# Patient Record
Sex: FEMALE | Race: BLACK OR AFRICAN AMERICAN | Employment: PART TIME | URBAN - METROPOLITAN AREA
[De-identification: names, ages, dates, MRNs, and addresses within clinical notes are randomized per-mention and may not be internally consistent; named-entity substitution may affect disease eponyms.]

---

## 2017-01-27 ENCOUNTER — HOSPITAL ENCOUNTER (OUTPATIENT)
Age: 52
Setting detail: OBSERVATION
Discharge: HOME HEALTH | End: 2017-01-28
Attending: EMERGENCY MEDICINE | Admitting: FAMILY MEDICINE
Payer: MEDICARE

## 2017-01-27 ENCOUNTER — APPOINTMENT (OUTPATIENT)
Dept: CT IMAGING | Age: 52
End: 2017-01-27
Attending: FAMILY MEDICINE
Payer: MEDICARE

## 2017-01-27 ENCOUNTER — APPOINTMENT (OUTPATIENT)
Dept: CT IMAGING | Age: 52
End: 2017-01-27
Attending: EMERGENCY MEDICINE
Payer: MEDICARE

## 2017-01-27 ENCOUNTER — APPOINTMENT (OUTPATIENT)
Dept: GENERAL RADIOLOGY | Age: 52
End: 2017-01-27
Attending: EMERGENCY MEDICINE
Payer: MEDICARE

## 2017-01-27 DIAGNOSIS — G40.909 SEIZURE DISORDER (HCC): ICD-10-CM

## 2017-01-27 DIAGNOSIS — R55 VASOVAGAL SYNCOPE: ICD-10-CM

## 2017-01-27 DIAGNOSIS — R10.13 ABDOMINAL PAIN, EPIGASTRIC: ICD-10-CM

## 2017-01-27 DIAGNOSIS — I95.9 HYPOTENSION, UNSPECIFIED HYPOTENSION TYPE: ICD-10-CM

## 2017-01-27 DIAGNOSIS — R51.9 HEADACHE, UNSPECIFIED HEADACHE TYPE: ICD-10-CM

## 2017-01-27 DIAGNOSIS — R55 SYNCOPE, UNSPECIFIED SYNCOPE TYPE: Primary | ICD-10-CM

## 2017-01-27 LAB
ALBUMIN SERPL BCP-MCNC: 3.7 G/DL (ref 3.5–5)
ALBUMIN/GLOB SERPL: 1 {RATIO} (ref 1.1–2.2)
ALP SERPL-CCNC: 105 U/L (ref 45–117)
ALT SERPL-CCNC: 44 U/L (ref 12–78)
ANION GAP BLD CALC-SCNC: 12 MMOL/L (ref 5–15)
AST SERPL W P-5'-P-CCNC: 24 U/L (ref 15–37)
BASOPHILS # BLD AUTO: 0 K/UL (ref 0–0.1)
BASOPHILS # BLD: 1 % (ref 0–1)
BILIRUB SERPL-MCNC: 0.3 MG/DL (ref 0.2–1)
BUN SERPL-MCNC: 26 MG/DL (ref 6–20)
BUN/CREAT SERPL: 23 (ref 12–20)
CALCIUM SERPL-MCNC: 8.9 MG/DL (ref 8.5–10.1)
CHLORIDE SERPL-SCNC: 105 MMOL/L (ref 97–108)
CK SERPL-CCNC: 46 U/L (ref 26–192)
CO2 SERPL-SCNC: 26 MMOL/L (ref 21–32)
CREAT SERPL-MCNC: 1.12 MG/DL (ref 0.55–1.02)
EOSINOPHIL # BLD: 0.1 K/UL (ref 0–0.4)
EOSINOPHIL NFR BLD: 1 % (ref 0–7)
ERYTHROCYTE [DISTWIDTH] IN BLOOD BY AUTOMATED COUNT: 13.2 % (ref 11.5–14.5)
EST. AVERAGE GLUCOSE BLD GHB EST-MCNC: 137 MG/DL
GLOBULIN SER CALC-MCNC: 3.6 G/DL (ref 2–4)
GLUCOSE BLD STRIP.AUTO-MCNC: 100 MG/DL (ref 65–100)
GLUCOSE BLD STRIP.AUTO-MCNC: 101 MG/DL (ref 65–100)
GLUCOSE SERPL-MCNC: 93 MG/DL (ref 65–100)
HBA1C MFR BLD: 6.4 % (ref 4.2–6.3)
HCT VFR BLD AUTO: 41.3 % (ref 35–47)
HGB BLD-MCNC: 13.7 G/DL (ref 11.5–16)
LIPASE SERPL-CCNC: 91 U/L (ref 73–393)
LYMPHOCYTES # BLD AUTO: 37 % (ref 12–49)
LYMPHOCYTES # BLD: 2.2 K/UL (ref 0.8–3.5)
MAGNESIUM SERPL-MCNC: 1.9 MG/DL (ref 1.6–2.4)
MCH RBC QN AUTO: 26.9 PG (ref 26–34)
MCHC RBC AUTO-ENTMCNC: 33.2 G/DL (ref 30–36.5)
MCV RBC AUTO: 81 FL (ref 80–99)
MONOCYTES # BLD: 0.2 K/UL (ref 0–1)
MONOCYTES NFR BLD AUTO: 3 % (ref 5–13)
NEUTS SEG # BLD: 3.4 K/UL (ref 1.8–8)
NEUTS SEG NFR BLD AUTO: 58 % (ref 32–75)
PLATELET # BLD AUTO: 390 K/UL (ref 150–400)
POTASSIUM SERPL-SCNC: 3.3 MMOL/L (ref 3.5–5.1)
PROT SERPL-MCNC: 7.3 G/DL (ref 6.4–8.2)
RBC # BLD AUTO: 5.1 M/UL (ref 3.8–5.2)
SERVICE CMNT-IMP: ABNORMAL
SERVICE CMNT-IMP: NORMAL
SODIUM SERPL-SCNC: 143 MMOL/L (ref 136–145)
TROPONIN I SERPL-MCNC: <0.04 NG/ML
TROPONIN I SERPL-MCNC: <0.04 NG/ML
TSH SERPL DL<=0.05 MIU/L-ACNC: 1.69 UIU/ML (ref 0.36–3.74)
WBC # BLD AUTO: 5.9 K/UL (ref 3.6–11)

## 2017-01-27 PROCEDURE — 83735 ASSAY OF MAGNESIUM: CPT | Performed by: EMERGENCY MEDICINE

## 2017-01-27 PROCEDURE — 99218 HC RM OBSERVATION: CPT

## 2017-01-27 PROCEDURE — 36415 COLL VENOUS BLD VENIPUNCTURE: CPT | Performed by: EMERGENCY MEDICINE

## 2017-01-27 PROCEDURE — 84443 ASSAY THYROID STIM HORMONE: CPT | Performed by: FAMILY MEDICINE

## 2017-01-27 PROCEDURE — 96375 TX/PRO/DX INJ NEW DRUG ADDON: CPT

## 2017-01-27 PROCEDURE — 80053 COMPREHEN METABOLIC PANEL: CPT | Performed by: EMERGENCY MEDICINE

## 2017-01-27 PROCEDURE — 74176 CT ABD & PELVIS W/O CONTRAST: CPT

## 2017-01-27 PROCEDURE — 96366 THER/PROPH/DIAG IV INF ADDON: CPT

## 2017-01-27 PROCEDURE — 82550 ASSAY OF CK (CPK): CPT | Performed by: EMERGENCY MEDICINE

## 2017-01-27 PROCEDURE — 96374 THER/PROPH/DIAG INJ IV PUSH: CPT

## 2017-01-27 PROCEDURE — 83036 HEMOGLOBIN GLYCOSYLATED A1C: CPT | Performed by: FAMILY MEDICINE

## 2017-01-27 PROCEDURE — 96361 HYDRATE IV INFUSION ADD-ON: CPT

## 2017-01-27 PROCEDURE — 82962 GLUCOSE BLOOD TEST: CPT

## 2017-01-27 PROCEDURE — 99285 EMERGENCY DEPT VISIT HI MDM: CPT

## 2017-01-27 PROCEDURE — 70450 CT HEAD/BRAIN W/O DYE: CPT

## 2017-01-27 PROCEDURE — 74022 RADEX COMPL AQT ABD SERIES: CPT

## 2017-01-27 PROCEDURE — 85025 COMPLETE CBC W/AUTO DIFF WBC: CPT | Performed by: EMERGENCY MEDICINE

## 2017-01-27 PROCEDURE — 74011250636 HC RX REV CODE- 250/636: Performed by: FAMILY MEDICINE

## 2017-01-27 PROCEDURE — 96365 THER/PROPH/DIAG IV INF INIT: CPT

## 2017-01-27 PROCEDURE — 74011250637 HC RX REV CODE- 250/637: Performed by: FAMILY MEDICINE

## 2017-01-27 PROCEDURE — 93306 TTE W/DOPPLER COMPLETE: CPT

## 2017-01-27 PROCEDURE — 74011250636 HC RX REV CODE- 250/636: Performed by: EMERGENCY MEDICINE

## 2017-01-27 PROCEDURE — 96376 TX/PRO/DX INJ SAME DRUG ADON: CPT

## 2017-01-27 PROCEDURE — 83690 ASSAY OF LIPASE: CPT | Performed by: EMERGENCY MEDICINE

## 2017-01-27 PROCEDURE — 84484 ASSAY OF TROPONIN QUANT: CPT | Performed by: FAMILY MEDICINE

## 2017-01-27 PROCEDURE — 96372 THER/PROPH/DIAG INJ SC/IM: CPT

## 2017-01-27 RX ORDER — ESCITALOPRAM OXALATE 10 MG/1
20 TABLET ORAL DAILY
Status: DISCONTINUED | OUTPATIENT
Start: 2017-01-28 | End: 2017-01-28 | Stop reason: HOSPADM

## 2017-01-27 RX ORDER — LEVETIRACETAM 500 MG/1
500 TABLET ORAL 2 TIMES DAILY
Status: DISCONTINUED | OUTPATIENT
Start: 2017-01-27 | End: 2017-01-27

## 2017-01-27 RX ORDER — DEXTROSE 50 % IN WATER (D50W) INTRAVENOUS SYRINGE
12.5-25 AS NEEDED
Status: DISCONTINUED | OUTPATIENT
Start: 2017-01-27 | End: 2017-01-28 | Stop reason: HOSPADM

## 2017-01-27 RX ORDER — ATORVASTATIN CALCIUM 10 MG/1
10 TABLET, FILM COATED ORAL
COMMUNITY

## 2017-01-27 RX ORDER — OXYCODONE AND ACETAMINOPHEN 10; 325 MG/1; MG/1
1 TABLET ORAL
Status: DISCONTINUED | OUTPATIENT
Start: 2017-01-27 | End: 2017-01-28 | Stop reason: HOSPADM

## 2017-01-27 RX ORDER — POTASSIUM CHLORIDE 750 MG/1
20 TABLET, FILM COATED, EXTENDED RELEASE ORAL
Status: DISCONTINUED | OUTPATIENT
Start: 2017-01-27 | End: 2017-01-27

## 2017-01-27 RX ORDER — MORPHINE SULFATE 2 MG/ML
1 INJECTION, SOLUTION INTRAMUSCULAR; INTRAVENOUS
Status: DISCONTINUED | OUTPATIENT
Start: 2017-01-27 | End: 2017-01-28 | Stop reason: HOSPADM

## 2017-01-27 RX ORDER — SODIUM CHLORIDE 9 MG/ML
75 INJECTION, SOLUTION INTRAVENOUS CONTINUOUS
Status: DISCONTINUED | OUTPATIENT
Start: 2017-01-27 | End: 2017-01-28 | Stop reason: HOSPADM

## 2017-01-27 RX ORDER — ACETAMINOPHEN 325 MG/1
650 TABLET ORAL
Status: DISCONTINUED | OUTPATIENT
Start: 2017-01-27 | End: 2017-01-28 | Stop reason: HOSPADM

## 2017-01-27 RX ORDER — POTASSIUM CHLORIDE 14.9 MG/ML
10 INJECTION INTRAVENOUS
Status: COMPLETED | OUTPATIENT
Start: 2017-01-27 | End: 2017-01-28

## 2017-01-27 RX ORDER — INSULIN LISPRO 100 [IU]/ML
INJECTION, SOLUTION INTRAVENOUS; SUBCUTANEOUS EVERY 6 HOURS
Status: DISCONTINUED | OUTPATIENT
Start: 2017-01-27 | End: 2017-01-28 | Stop reason: HOSPADM

## 2017-01-27 RX ORDER — MAGNESIUM SULFATE 100 %
4 CRYSTALS MISCELLANEOUS AS NEEDED
Status: DISCONTINUED | OUTPATIENT
Start: 2017-01-27 | End: 2017-01-28 | Stop reason: HOSPADM

## 2017-01-27 RX ORDER — MORPHINE SULFATE 4 MG/ML
4 INJECTION, SOLUTION INTRAMUSCULAR; INTRAVENOUS ONCE
Status: COMPLETED | OUTPATIENT
Start: 2017-01-27 | End: 2017-01-27

## 2017-01-27 RX ORDER — PANTOPRAZOLE SODIUM 40 MG/1
40 TABLET, DELAYED RELEASE ORAL DAILY
Status: DISCONTINUED | OUTPATIENT
Start: 2017-01-28 | End: 2017-01-28 | Stop reason: HOSPADM

## 2017-01-27 RX ORDER — SODIUM CHLORIDE 0.9 % (FLUSH) 0.9 %
5-10 SYRINGE (ML) INJECTION EVERY 8 HOURS
Status: DISCONTINUED | OUTPATIENT
Start: 2017-01-27 | End: 2017-01-28 | Stop reason: HOSPADM

## 2017-01-27 RX ORDER — ATORVASTATIN CALCIUM 10 MG/1
10 TABLET, FILM COATED ORAL
Status: DISCONTINUED | OUTPATIENT
Start: 2017-01-27 | End: 2017-01-28 | Stop reason: HOSPADM

## 2017-01-27 RX ORDER — HEPARIN SODIUM 5000 [USP'U]/ML
5000 INJECTION, SOLUTION INTRAVENOUS; SUBCUTANEOUS EVERY 8 HOURS
Status: DISCONTINUED | OUTPATIENT
Start: 2017-01-27 | End: 2017-01-28 | Stop reason: HOSPADM

## 2017-01-27 RX ORDER — TOPIRAMATE 100 MG/1
100 TABLET, FILM COATED ORAL DAILY
Status: DISCONTINUED | OUTPATIENT
Start: 2017-01-28 | End: 2017-01-28 | Stop reason: HOSPADM

## 2017-01-27 RX ORDER — SODIUM CHLORIDE 0.9 % (FLUSH) 0.9 %
5-10 SYRINGE (ML) INJECTION AS NEEDED
Status: DISCONTINUED | OUTPATIENT
Start: 2017-01-27 | End: 2017-01-28 | Stop reason: HOSPADM

## 2017-01-27 RX ORDER — POLYETHYLENE GLYCOL 3350 17 G/17G
17 POWDER, FOR SOLUTION ORAL 2 TIMES DAILY
COMMUNITY
End: 2017-01-28

## 2017-01-27 RX ADMIN — SODIUM CHLORIDE 1000 ML: 900 INJECTION, SOLUTION INTRAVENOUS at 13:56

## 2017-01-27 RX ADMIN — POTASSIUM CHLORIDE 10 MEQ: 200 INJECTION, SOLUTION INTRAVENOUS at 23:24

## 2017-01-27 RX ADMIN — Medication 10 ML: at 21:33

## 2017-01-27 RX ADMIN — HEPARIN SODIUM 5000 UNITS: 5000 INJECTION, SOLUTION INTRAVENOUS; SUBCUTANEOUS at 21:32

## 2017-01-27 RX ADMIN — POTASSIUM CHLORIDE 10 MEQ: 200 INJECTION, SOLUTION INTRAVENOUS at 21:32

## 2017-01-27 RX ADMIN — SODIUM CHLORIDE 75 ML/HR: 900 INJECTION, SOLUTION INTRAVENOUS at 21:31

## 2017-01-27 RX ADMIN — ATORVASTATIN CALCIUM 10 MG: 10 TABLET, FILM COATED ORAL at 21:32

## 2017-01-27 RX ADMIN — Medication 4 MG: at 13:59

## 2017-01-27 RX ADMIN — PROMETHAZINE HYDROCHLORIDE 12.5 MG: 25 INJECTION INTRAMUSCULAR; INTRAVENOUS at 13:59

## 2017-01-27 RX ADMIN — LEVETIRACETAM 500 MG: 100 SOLUTION ORAL at 21:33

## 2017-01-27 RX ADMIN — OXYCODONE HYDROCHLORIDE AND ACETAMINOPHEN 1 TABLET: 10; 325 TABLET ORAL at 21:32

## 2017-01-27 RX ADMIN — Medication 4 MG: at 16:06

## 2017-01-27 NOTE — IP AVS SNAPSHOT
[address] [facility] [address] [facility] 
[phone] Patient: Erich Armas MRN: [id number] :1965 You are allergic to the following No active allergies Recent Documentation OB Status Smoking Status Hysterectomy Never Smoker Emergency Contacts Name Discharge Info Relation Home Work Mobile Fadi Neal  Spouse [3] [phone] Charan Akhtar CAREGIVER [3] Friend [5] [phone] Ella  Other Relative [6] [phone] Marcie Goel DISCHARGE CAREGIVER [3] Daughter [21] [phone] About your hospitalization You were admitted on:  2017 You last received care in the:  [facility] 5 OBSERVATION You were discharged on:  2017 Unit phone number:  [phone] Why you were hospitalized Your primary diagnosis was:  Syncope Your diagnoses also included:  Seizure Disorder (Hcc), Thor (Obstructive Sleep Apnea), Jejunostomy Tube Present (Hcc), Hyperlipidemia, Gerd (Gastroesophageal Reflux Disease), Gastroparesis, Essential Hypertension, Diabetes Mellitus Type 2, Controlled (Hcc), Hypotension Providers Seen During Your Hospitalizations Provider Role Specialty Primary office phone Jeff Li MD Attending Provider Emergency Medicine [phone] Debbie Ortiz MD Attending Provider Hospitalist [phone] [address] [place] [address] 951, DO Attending Provider Internal Medicine [phone] Your Primary Care Physician (PCP) Primary Care Physician Office Phone Office Fax [place]thington Daryl [phone] Follow-up Information Follow up With Details Comments Contact Info Lucinda Wilder MD   [address] [place] [address] 
[phone] Current Discharge Medication List  
  
START taking these medications Dose & Instructions Dispensing Information Comments Morning Noon Evening Bedtime  
 midodrine 5 mg tablet Commonly known as:  Chelsy Lyndsey Your next dose is: Today, Tomorrow Other:  _________ Dose:  5 mg Take 1 Tab by mouth three (3) times daily (with meals) for 30 days. Quantity:  90 Tab Refills:  0 CONTINUE these medications which have NOT CHANGED Dose & Instructions Dispensing Information Comments Morning Noon Evening Bedtime  
 atorvastatin 10 mg tablet Commonly known as:  LIPITOR Your next dose is: Today, Tomorrow Other:  _________ Dose:  10 mg Take 10 mg by mouth nightly. Refills:  0 LEXAPRO 20 mg tablet Generic drug:  escitalopram oxalate Your next dose is: Today, Tomorrow Other:  _________ Dose:  20 mg Take 20 mg by mouth daily. Refills:  0  
     
   
   
   
  
 metFORMIN 500 mg tablet Commonly known as:  GLUCOPHAGE Your next dose is: Today, Tomorrow Other:  _________ Dose:  500 mg Take 500 mg by mouth two (2) times daily (with meals). Refills:  0  
     
   
   
   
  
 omeprazole 20 mg capsule Commonly known as:  PRILOSEC Your next dose is: Today, Tomorrow Other:  _________ 1 po bid prn Quantity:  60 Cap Refills:  5  
     
   
   
   
  
 oxyCODONE-acetaminophen  mg per tablet Commonly known as:  PERCOCET 10 Your next dose is: Today, Tomorrow Other:  _________ Dose:  1 Tab Take 1 Tab by mouth every six (6) hours as needed for Pain. Refills:  0  
     
   
   
   
  
 TOPAMAX 100 mg tablet Generic drug:  topiramate Your next dose is: Today, Tomorrow Other:  _________ Dose:  100 mg Take 100 mg by mouth daily. Refills:  0 STOP taking these medications MIRALAX 17 gram packet Generic drug:  polyethylene glycol Where to Get Your Medications Information on where to get these meds will be given to you by the nurse or doctor. ! Ask your nurse or doctor about these medications  
  midodrine 5 mg tablet Discharge Instructions None Discharge Orders None S[employer]ethe[employer] Announcement We are excited to announce that we are making your provider's discharge notes available to you in Slicethepie. You will see these notes when they are completed and signed by the physician that discharged you from your recent hospital stay. If you have any questions or concerns about any information you see in Slicethepie, please call the Health Information Department where you were seen or reach out to your Primary Care Provider for more information about your plan of care. Introducing [facility]! Dear Fadi Sees: Thank you for requesting a Slicethepie account. Our records indicate that you already have an active Slicethepie account. You can access your account anytime at [email or web address] Did you know that you can access your hospital and ER discharge instructions at any time in Slicethepie? You can also review all of your test results from your hospital stay or ER visit. Additional Information If you have questions, please visit the Frequently Asked Questions section of the S[employer]ethe[employer] website at [email or web address] Remember, S[employer]ethepie is NOT to be used for urgent needs. For medical emergencies, dial 911. Now available from your iPhone and Android! General Information Please provide this summary of care documentation to your next provider. Patient Signature:  ____________________________________________________________ Date:  ____________________________________________________________  
  
[facility]  Provider Signature: ____________________________________________________________ Date:  ____________________________________________________________

## 2017-01-27 NOTE — H&P
[address] [facility] [address]   HISTORY AND PHYSICAL       Name:  Agnieszka Patel   MR#:  [id number]   :  1965   Account #:  [de-identified]        Date of Adm:  2017       ATTENDING PHYSICIAN: Karrie Brown MD.    CHIEF COMPLAINT: Loss of consciousness. HISTORY OF PRESENT ILLNESS: The patient is a 61-year-old   female with a past medical history of hypertension, diabetes, seizures,   gastroparesis, status post gastric bypass with a J-tube, history of   hypercholesterolemia, diverticulitis, sleep apnea, anemia, and renal   artery aneurysm, who presents to the hospital complaining of the   above-mentioned symptoms. History was obtained from the patient. The patient apparently reports that she was at a [employer] today   and was having lunch when she suddenly had significant amount of   abdominal pain, had a headache and apparently passed out. The   patient reports that she had a friend who is not present at this point of   time, but told her that she was having \"seizure-like activity. \" Per ER   note the witness did not notice any shaking. Per EMS, the patient was   weak, but had no postictal confusion and her blood pressure on the   scene was 56 by. The patient reports that she has had some   nausea and vomiting associated with diarrhea in the past few days. She has had family members who have been sick including her   grandson, and her daughter who had been seen in the hospital for a   \"GI bug.\" The patient denies any other complaints or problems. Denies   any recent seizure. She reports she takes Topamax for seizures.    Denies any headache, blurry vision, sore throat, trouble swallowing,   trouble with speech, any chest pain, shortness of breath,   lightheadedness, dizziness, focal or generalized neurological   weakness, hematemesis, melena, hemoptysis, urinary symptoms,   focal or generalized neurological weakness, recent travels or sick contacts. PAST MEDICAL HISTORY: See above. HOME MEDICATIONS    1. Lipitor 10 mg daily. 2. MiraLax 17 grams b.i.d.   3. Percocet 1 tablet every 6 hours as needed for pain. 4. Metformin 500 mg b.i.d.   5. Omeprazole 20 mg daily. 6. Lexapro 20 mg daily. 7. Topamax 100 mg daily. SOCIAL HISTORY: Denies alcohol, tobacco, IV drug abuse. Lives at   home. REVIEW OF SYSTEMS   A 10-point review of systems was done. It is essentially negative   except for symptoms mentioned above. ALLERGIES: NO KNOWN DRUG ALLERGIES. FAMILY HISTORY: Mother had history of diabetes, hypertension,   hyperlipidemia, coronary artery disease and CVA. Father had a history   of hypertension. PHYSICAL EXAMINATION   VITAL SIGNS: Temperature 97.7, pulse 80, respiratory rate 14, blood   pressure 115/76, pulse oximetry 97% on room air. GENERAL: Alert and oriented x3, awake, in no acute distress, resting   in bed, pleasant female, appears to be stated age. HEENT: Pupils equal and reactive to light. Dry mucous membranes. Tympanic membranes clear. NECK: Supple. CHEST: Clear to auscultation bilaterally. CORONARY: S1, S2 were heard. ABDOMEN: Soft, tender to palpation diffusely. No rebound. Mild   guarding. EXTREMITIES: No clubbing, no cyanosis, no edema. NEUROPSYCHIATRIC: Pleasant mood and affect. Sensory grossly   within normal limits. DTR 2+ into 4. Cranial nerves 2-12 grossly intact. Strength 5/5. SKIN: Warm. LABORATORY DATA: White count 5.9, hemoglobin 13.7, hematocrit   41.3, platelets 279. Sodium 142, potassium 3.3, chloride 105, BUN 26,   creatinine 1.12, calcium 8.9, magnesium 1.9, bilirubin total 0.3, protein   7.3, albumin 3.7, ALT 44, AST 24, alkaline phosphatase 105, lipase   91. CK 46.  CT of the abdomen and pelvis shows status post   hysterectomy and appendectomy, moderate status is stable, a 2.2   right renal artery aneurysm, dating back to 04/13/2008, no acute   abnormality identified. ASSESSMENT AND PLAN   1. Syncope, unclear etiology, appears to be secondary to hypertension   and I am not sure why the patient is hypertensive. There was concern   for seizure. Regardless, the patient will be observed on a telemetry   bed. The patient will be on seizure precautions. Will start the patient   on IV hydration, neurovascular checks, neurology consult. CT of the   head has been ordered and we will review the same. Will provide   orthostatic vitals, echocardiogram and further intervention will be per   hospital course. Will reassess as needed. Continue to closely monitor. Further intervention will be per hospital course. 2. Abdominal pain. unclear etiology. The patient has had some   diarrhea. Will check stool for culture. at this point in time. Will   provide pain medication, IV fluids and continue to closely monitor. Further intervention will be per hospital course. 3. Diabetes type 2: HARI ceron. 4. Gastrointestinal and deep venous thrombosis prophylaxis. The   patient is on SCDs.         Collier Aschoff, MD      MM / DV   D:  01/27/2017   17:58   T:  01/27/2017   18:54   Job #:  [id number]

## 2017-01-27 NOTE — ED TRIAGE NOTES
Pt arrives via EMS from [facility]. Friend witnessed pt staring off while at restaurant, no shaking noted and no incontinence but pt vomited. EMS stating that pt was initially hypotensive at 56/30 but no post ictal phase noted. Pt denies knowing when last seizure was but is prescribed Topamax.  Pt presents A&0 x 4 but c/o headache     Hx: gastroparesis, gastrectomy,     BS: 167
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
## 2017-01-27 NOTE — IP AVS SNAPSHOT
Current Discharge Medication List  
  
Take these medications at their scheduled times Dose & Instructions Dispensing Information Comments Morning Noon Evening Bedtime  
 atorvastatin 10 mg tablet Commonly known as:  LIPITOR Your next dose is: Today, Tomorrow Other:  ____________ Dose:  10 mg Take 10 mg by mouth nightly. Refills:  0 LEXAPRO 20 mg tablet Generic drug:  escitalopram oxalate Your next dose is: Today, Tomorrow Other:  ____________ Dose:  20 mg Take 20 mg by mouth daily. Refills:  0  
     
   
   
   
  
 metFORMIN 500 mg tablet Commonly known as:  GLUCOPHAGE Your next dose is: Today, Tomorrow Other:  ____________ Dose:  500 mg Take 500 mg by mouth two (2) times daily (with meals). Refills:  0  
     
   
   
   
  
 midodrine 5 mg tablet Commonly known as:  Zenaida Uriah Your next dose is: Today, Tomorrow Other:  ____________ Dose:  5 mg Take 1 Tab by mouth three (3) times daily (with meals) for 30 days. Quantity:  90 Tab Refills:  0  
     
   
   
   
  
 TOPAMAX 100 mg tablet Generic drug:  topiramate Your next dose is: Today, Tomorrow Other:  ____________ Dose:  100 mg Take 100 mg by mouth daily. Refills:  0 Take these medications as needed Dose & Instructions Dispensing Information Comments Morning Noon Evening Bedtime  
 oxyCODONE-acetaminophen  mg per tablet Commonly known as:  PERCOCET 10 Your next dose is: Today, Tomorrow Other:  ____________ Dose:  1 Tab Take 1 Tab by mouth every six (6) hours as needed for Pain. Refills:  0 Take these medications as directed Dose & Instructions Dispensing Information Comments Morning Noon Evening Bedtime  
 omeprazole 20 mg capsule Commonly known as:  PRILOSEC Your next dose is: Today, Tomorrow Other:  ____________ 1 po bid prn Quantity:  60 Cap Refills:  5 Where to Get Your Medications Information about where to get these medications is not yet available ! Ask your nurse or doctor about these medications  
  midodrine 5 mg tablet
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
## 2017-01-28 VITALS
HEART RATE: 76 BPM | SYSTOLIC BLOOD PRESSURE: 108 MMHG | OXYGEN SATURATION: 98 % | DIASTOLIC BLOOD PRESSURE: 74 MMHG | RESPIRATION RATE: 16 BRPM | TEMPERATURE: 98.7 F

## 2017-01-28 LAB
GLUCOSE BLD STRIP.AUTO-MCNC: 109 MG/DL (ref 65–100)
GLUCOSE BLD STRIP.AUTO-MCNC: 174 MG/DL (ref 65–100)
SERVICE CMNT-IMP: ABNORMAL
SERVICE CMNT-IMP: ABNORMAL

## 2017-01-28 PROCEDURE — 74011636637 HC RX REV CODE- 636/637: Performed by: FAMILY MEDICINE

## 2017-01-28 PROCEDURE — 74011250636 HC RX REV CODE- 250/636: Performed by: FAMILY MEDICINE

## 2017-01-28 PROCEDURE — 74011250637 HC RX REV CODE- 250/637: Performed by: FAMILY MEDICINE

## 2017-01-28 PROCEDURE — 99218 HC RM OBSERVATION: CPT

## 2017-01-28 PROCEDURE — 96375 TX/PRO/DX INJ NEW DRUG ADDON: CPT

## 2017-01-28 PROCEDURE — 82962 GLUCOSE BLOOD TEST: CPT

## 2017-01-28 PROCEDURE — 96376 TX/PRO/DX INJ SAME DRUG ADON: CPT

## 2017-01-28 PROCEDURE — 77030012890

## 2017-01-28 PROCEDURE — 96361 HYDRATE IV INFUSION ADD-ON: CPT

## 2017-01-28 PROCEDURE — 96372 THER/PROPH/DIAG INJ SC/IM: CPT

## 2017-01-28 RX ORDER — MIDODRINE HYDROCHLORIDE 5 MG/1
5 TABLET ORAL
Qty: 90 TAB | Refills: 0 | Status: SHIPPED | OUTPATIENT
Start: 2017-01-28 | End: 2017-02-27

## 2017-01-28 RX ORDER — MIDODRINE HYDROCHLORIDE 2.5 MG/1
5 TABLET ORAL
Status: DISCONTINUED | OUTPATIENT
Start: 2017-01-28 | End: 2017-01-28 | Stop reason: HOSPADM

## 2017-01-28 RX ORDER — ONDANSETRON 2 MG/ML
4 INJECTION INTRAMUSCULAR; INTRAVENOUS
Status: DISCONTINUED | OUTPATIENT
Start: 2017-01-28 | End: 2017-01-28 | Stop reason: HOSPADM

## 2017-01-28 RX ADMIN — ESCITALOPRAM OXALATE 20 MG: 10 TABLET, FILM COATED ORAL at 11:05

## 2017-01-28 RX ADMIN — ONDANSETRON 4 MG: 2 INJECTION INTRAMUSCULAR; INTRAVENOUS at 11:04

## 2017-01-28 RX ADMIN — SODIUM CHLORIDE 75 ML/HR: 900 INJECTION, SOLUTION INTRAVENOUS at 11:23

## 2017-01-28 RX ADMIN — TOPIRAMATE 100 MG: 100 TABLET, FILM COATED ORAL at 11:05

## 2017-01-28 RX ADMIN — Medication 10 ML: at 14:13

## 2017-01-28 RX ADMIN — PANTOPRAZOLE SODIUM 40 MG: 40 TABLET, DELAYED RELEASE ORAL at 11:05

## 2017-01-28 RX ADMIN — Medication 10 ML: at 07:03

## 2017-01-28 RX ADMIN — Medication 1 MG: at 03:37

## 2017-01-28 RX ADMIN — HEPARIN SODIUM 5000 UNITS: 5000 INJECTION, SOLUTION INTRAVENOUS; SUBCUTANEOUS at 05:20

## 2017-01-28 RX ADMIN — LEVETIRACETAM 500 MG: 100 SOLUTION ORAL at 11:12

## 2017-01-28 RX ADMIN — Medication 1 MG: at 11:12

## 2017-01-28 RX ADMIN — ONDANSETRON 4 MG: 2 INJECTION INTRAMUSCULAR; INTRAVENOUS at 03:53

## 2017-01-28 RX ADMIN — INSULIN LISPRO 2 UNITS: 100 INJECTION, SOLUTION INTRAVENOUS; SUBCUTANEOUS at 12:43

## 2017-01-28 RX ADMIN — HEPARIN SODIUM 5000 UNITS: 5000 INJECTION, SOLUTION INTRAVENOUS; SUBCUTANEOUS at 14:13

## 2017-01-28 NOTE — PROGRESS NOTES
Went in to check on pt. Pt states she feels dizzy, and like \"the boat it rocking. \"  She states it is getting better but still bracing herself on the bed. Took BP while sitting and got her to stand, pt not orthostatic there. She states she did not start feeling dizzy until after she stood up in the BR. Pt requested to walk a little to see if it happened again. Pt made it approx 25 ft prior stating she felt dizzy and needed to sit down. She recovered a little quicker and BP did not drop. Will continue to monitor pt.

## 2017-01-28 NOTE — CONSULTS
[facility] [address]   [address] [facility] ([place], [place])       Name:  Sailaja Bolanos   MR#:  [id number]   :  1965   Account #:  [de-identified]    Date of Consultation:     Date of Adm:  2017       REQUESTING PHYSICIAN: Dr. Ke Hernandez. REASON FOR EVALUATION: Syncope. HISTORY OF PRESENT ILLNESS: This patient is a 25-year-old   female with past medical history of hypertension, diabetes, seizure-like   activity, who follows up with a neurologist, Dr. Colt Perrin. She has also had   gastric bypass. with J-tube, hypercholesterolemia, sleep apnea and   renal artery aneurysm. She has migraine headaches and is on   topiramate for prophylaxis. She went to eat with her friend yesterday   and was having some abdominal pain. She had eaten some when she   started to feel sweaty and lightheaded. The next thing she knows is   waking up with paramedics around her. Apparently, her friend reported   that her eyes rolled back and she slumped over the table. Some   twitching movement was seen in her extremities. She has had similar   episodes before and was diagnosed with possible partial seizures by   Dr. Colt Perrin. She was brought to the hospital and was admitted for   observation. She was found to be hypotensive on arrival. This morning   she feels fine and is back to her baseline. She does get migraines a   few times a month. Denies any changes in vision, speech, focal motor   sensory deficits, chest pain, shortness of breath, palpitations, or   difficulty with balance. PAST MEDICAL HISTORY: As mentioned above. HOME MEDICATIONS   1. Lipitor. 2. Percocet as needed. 3. Metformin. 4. Omeprazole. 5. Topamax 100 mg daily. 6. Lexapro. SOCIAL HISTORY: No history of smoking or alcohol use. REVIEW OF SYSTEMS: As mentioned above. ALLERGIES: NONE. FAMILY HISTORY: No history of epilepsy in the family.  Mother with   diabetes, hypertension, hyperlipidemia, coronary artery disease and   stroke. Father with hypertension. PHYSICAL EXAMINATION   GENERAL: The patient is alert, fully oriented. VITAL SIGNS: Blood pressure 111/70, temperature 98.6, pulse 84. HEENT: Speech is clear. Comprehension is normal. Pupils are equal,   round and reactive. Extraocular movements are full. Face is   symmetric. Tongue is midline. His muscle tone and bulk is normal.   Strength is normal in all extremities. Deep tendon reflexes are 2/2 and   symmetric. HEART: Regular rate and rhythm. CHEST: Clear. ABDOMEN: Soft, nontender, positive bowel sounds. EXTREMITIES: No edema. LABORATORY DATA: CBC: Chemistries were unremarkable. Hemoglobin A1c 6.4. CT scan of the brain did not show any acute abnormalities. MRI scan of the brain last year did not show any abnormalities except   for mild nonspecific white matter changes. EEG in the past has been normal.    ASSESSMENT AND PLAN: A 49-year-old female admitted after an   episode where she had abdominal pain, headache followed by profuse   sweating, lightheadedness and then syncope with questionable   shaking movements. I suspect that this was most likely a vasovagal or   convulsive syncope. No additional neurological workup needed at this   time as it would not alter management. We should continue topiramate   as is. She should followup with her primary neurologist periodically. Please feel free to contact me with any further questions. Thank you   for this consultation.          MD TOSHIA Ross / Cherelle   D:  01/28/2017   12:18   T:  01/28/2017   12:46   Job #:  [id number]

## 2017-01-28 NOTE — PROGRESS NOTES
TRANSFER - IN REPORT:    Verbal report received from [facility] (name) on Render Newer  being received from ED (unit) for routine progression of care      Report consisted of patients Situation, Background, Assessment and   Recommendations(SBAR). Information from the following report(s) SBAR, Kardex, ED Summary, [facility] and Recent Results was reviewed with the receiving nurse. Opportunity for questions and clarification was provided. Assessment completed upon patients arrival to unit and care assumed.

## 2017-01-28 NOTE — ROUTINE PROCESS
Bedside and Verbal shift change report given to [address] [place] [address] (oncoming nurse) by GLENNY Singh RN (offgoing nurse). Report given with SBAR, Kardex, Intake/Output, MAR and Recent Results.

## 2017-01-28 NOTE — PROGRESS NOTES
CM received consult for home health services. NANCY spoke with Dr Radha Lin, [facility] physician, who requested home health referral be sent to [facility]. Dr Radha Lin understood confirmation of receipt of referral would be after discharge. CM sent referral via [employer] to [facility], and response is pending.

## 2017-01-28 NOTE — PROGRESS NOTES
Per Dr. Rebeka Medel pt is a [facility] pt and they should be seeing her. Spoke with Dr. Silvia Arroyo and he will inform the [facility] hospitalist.  Informed Dr. Rebeka Medel that this nurse had spoken with Dr. Silvia Arroyo.

## 2017-01-28 NOTE — ED NOTES
TRANSFER - OUT REPORT:    Verbal report given to Paulino Gallego RN(name) on Janet Eddy  being transferred to [facility](unit) for routine progression of care       Report consisted of patients Situation, Background, Assessment and   Recommendations(SBAR). Information from the following report(s) SBAR and MAR was reviewed with the receiving nurse. Lines:   Peripheral IV 01/27/17 Left Antecubital (Active)   Site Assessment Clean, dry, & intact 1/27/2017  1:51 PM   Phlebitis Assessment 0 1/27/2017  1:51 PM   Infiltration Assessment 0 1/27/2017  1:51 PM   Dressing Status Clean, dry, & intact 1/27/2017  1:51 PM        Opportunity for questions and clarification was provided.       Patient transported with:   [employer]

## 2017-01-28 NOTE — DISCHARGE SUMMARY
[facility]   [address] [facility] D[facility]  [phone]    Discharge Summary     PATIENT ID: Ileana Duron  MRN: [id number]   YOB: 1965    DATE OF ADMISSION: 1/27/2017 12:46 PM    DATE OF DISCHARGE: 1/28/17  PRIMARY CARE PROVIDER: Sid Walter MD       DISCHARGING PHYSICIAN: Kendra Staples DO    To contact this individual call [phone] and ask the  to page. If unavailable ask to be transferred the Adult Hospitalist Department. CONSULTATIONS: IP CONSULT TO HOSPITALIST  IP CONSULT TO NEUROLOGY    PROCEDURES/SURGERIES: * No surgery found *    ADMITTING [facility] COURSE:   The patient is a 66-year-old   female with a past medical history of hypertension, diabetes, seizures,   gastroparesis, status post gastric bypass with a J-tube, history of   hypercholesterolemia, diverticulitis, sleep apnea, anemia, and renal   artery aneurysm, who presents to the hospital complaining of the   Not feeling herself. History was obtained from the patient. The patient apparently reports that she was at a [email or web address] today   and was having lunch when she suddenly had significant amount of   abdominal pain, had a headache and started to star off without LOC. The   patient reports that she had a friend told her that she was shaking. Per ER   note the witness did not notice any shaking. Per EMS, the patient was   weak, but had no postictal confusion and her blood pressure on the   scene was 56/30. The patient reports that she has had some   nausea and vomiting associated with diarrhea in the past few days. Reports she normally does her feeding at night. She has had family members who have been sick including her   grandson, and her daughter who had been seen in the hospital for a   \"GI bug.\"  She reports she takes Topamax for seizures without recent episodes.    Denies any headache, blurry vision, sore throat, trouble swallowing,   trouble with speech, any chest pain, shortness of breath,   lightheadedness, dizziness, focal or generalized neurological   weakness, hematemesis, melena, hemoptysis, urinary symptoms,   focal or generalized neurological weakness, recent travels or sick   Contacts. After admission patient was evaluated by neurology, no further episodes of seizures, she received IVF since admission although remains having drop in BP from 108/74 to 72/39 with standing within 5 mins stand BP back up to 109/77. Discussed orthostatic hypotension with patient advised to push fluids and feeds at home. Not on any antihypertensives Give MURTAZA hose. Advised to get rechecked at [facility] office Monday. Will give trial of midodrine as well. DISCHARGE DIAGNOSES / PLAN:      1. Orthostatic Hypotension- will give midodrine, advised to wear MURTAZA hose during day, IF going from laying to standing sit for 5 mins and hold onto something for a few mins prior to ambulating after standing. PENDING TEST RESULTS:   At the time of discharge the following test results are still pending: none    FOLLOW UP APPOINTMENTS:    Follow-up Information     Follow up With Details Comments [address] Abbie Ordonez MD   [address] [place] [address]  [phone]             ADDITIONAL CARE RECOMMENDATIONS: Follow up with regular neurologist    DIET: PEG feeding as previous, push PO fluids at home    ACTIVITY: ambulate with assistance,  IF going from laying to standing sit for 5 mins and hold onto something for a few mins prior to ambulating after standing. Will have home health PT/OT    WOUND CARE: none    EQUIPMENT needed: none      DISCHARGE MEDICATIONS:  Current Discharge Medication List      START taking these medications    Details   midodrine (PROAMITINE) 5 mg tablet Take 1 Tab by mouth three (3) times daily (with meals) for 30 days.   Qty: 90 Tab, Refills: 0         CONTINUE these medications which have NOT CHANGED    Details   atorvastatin (LIPITOR) 10 mg tablet Take 10 mg by mouth nightly. oxyCODONE-acetaminophen (PERCOCET 10)  mg per tablet Take 1 Tab by mouth every six (6) hours as needed for Pain.      metFORMIN (GLUCOPHAGE) 500 mg tablet Take 500 mg by mouth two (2) times daily (with meals). omeprazole (PRILOSEC) 20 mg capsule 1 po bid prn  Qty: 60 Cap, Refills: 5    Associated Diagnoses: Gastroesophageal reflux disease without esophagitis      escitalopram oxalate (LEXAPRO) 20 mg tablet Take 20 mg by mouth daily. topiramate (TOPAMAX) 100 mg tablet Take 100 mg by mouth daily. STOP taking these medications       polyethylene glycol (MIRALAX) 17 gram packet Comments:   Reason for Stopping:                 NOTIFY YOUR PHYSICIAN FOR ANY OF THE FOLLOWING:   Fever over 101 degrees for 24 hours. Chest pain, shortness of breath, fever, chills, nausea, vomiting, diarrhea, change in mentation, falling, weakness, bleeding. Severe pain or pain not relieved by medications. Or, any other signs or symptoms that you may have questions about. DISPOSITION:  x  Home With:  x OT x PT x HH  RN       Long term SNF/Inpatient Rehab    Independent/assisted living    Hospice    Other:       PATIENT CONDITION AT DISCHARGE:     Functional status    Poor    x Deconditioned     Independent      Cognition   x  Lucid     Forgetful     Dementia      Catheters/lines (plus indication)    Nj     PICC    x PEG     None      Code status   x  Full code     DNR      PHYSICAL EXAMINATION AT DISCHARGE:    GENERAL: The patient is alert, fully oriented. VITAL SIGNS: Blood pressure 111/70, temperature 98.6, pulse 84. HEENT: Speech is clear. Comprehension is normal. Pupils are equal,   round and reactive. Extraocular movements are full. Face is   symmetric. Tongue is midline. His muscle tone and bulk is normal.   Strength is normal in all extremities. Deep tendon reflexes are 2/2 and   symmetric.    HEART: Regular rate and rhythm. CHEST: Clear. ABDOMEN: Soft, nontender, positive bowel sounds. EXTREMITIES: No edema.       CHRONIC MEDICAL DIAGNOSES:  Problem List as of 1/28/2017  Date Reviewed: 1/27/2017          Codes Class Noted - Resolved    * (Principal)Syncope ICD-10-CM: R55  ICD-9-CM: 780.2  1/27/2017 - Present        Hypotension ICD-10-CM: I95.9  ICD-9-CM: 458.9  1/27/2017 - Present        Diabetes mellitus type 2, controlled ([facility] ICD-10-CM: E11.9  ICD-9-CM: 250.00  9/30/2016 - Present        JANE (obstructive sleep apnea) ICD-10-CM: G47.33  ICD-9-CM: 327.23  5/26/2016 - Present        GERD (gastroesophageal reflux disease) ICD-10-CM: K21.9  ICD-9-CM: 530.81  2/23/2016 - Present        Essential hypertension ICD-10-CM: I10  ICD-9-CM: 401.9  10/20/2015 - Present        Gastroparesis ICD-10-CM: K31.84  ICD-9-CM: 536.3  10/20/2015 - Present    Overview Signed 8/25/2016  2:00 PM by Fredericka Kayser, MD     S/P gastric sleeve Aug 2016             Jejunostomy tube present [facility]) ICD-10-CM: Z93.4  ICD-9-CM: V44.4  10/20/2015 - Present        Hyperlipidemia ICD-10-CM: E78.5  ICD-9-CM: 272.4  10/20/2015 - Present        Seizure disorder ([facility] ICD-10-CM: G40.909  ICD-9-CM: 345.90  10/20/2015 - Present              Greater than 30 minutes were spent with the patient on counseling and coordination of care    Signed:   Zabrina Ray DO  1/28/2017  5:03 PM

## 2017-01-28 NOTE — PROGRESS NOTES
Went over discharge instructions with pt. IV discontinued. Pt is aware of new medication and how to take it. Pt has been spoken to about getting up slowly and using her rollator for assistance. Pt out in wheelchair to car, daughter to take her home.

## 2017-01-28 NOTE — CONSULTS
Consult dictated. Suspect a vasovagal convulsive syncope. No additional neuro work up needed. Continue Topiramate as is.  Follow up with primary neurologist.  Jerry Salas MD

## 2017-01-29 NOTE — PROGRESS NOTES
Post Discharge Note:  CM follow up. CM received Yes response to referral to [facility]. CM spoke with Ml Ansari, [address] [facility] Liaison, who confirmed acceptance and understood that patient is all ready discharged.

## 2017-03-23 ENCOUNTER — HOSPITAL ENCOUNTER (OUTPATIENT)
Dept: MAMMOGRAPHY | Age: 52
Discharge: HOME | End: 2017-03-23
Attending: FAMILY MEDICINE
Payer: MEDICARE

## 2017-03-23 DIAGNOSIS — Z12.31 VISIT FOR SCREENING MAMMOGRAM: ICD-10-CM

## 2017-03-23 PROCEDURE — 77067 SCR MAMMO BI INCL CAD: CPT

## 2017-05-02 ENCOUNTER — OFFICE VISIT (OUTPATIENT)
Dept: NEUROLOGY | Age: 52
End: 2017-05-02

## 2017-05-02 VITALS
SYSTOLIC BLOOD PRESSURE: 170 MMHG | BODY MASS INDEX: 20.98 KG/M2 | DIASTOLIC BLOOD PRESSURE: 110 MMHG | WEIGHT: 114 LBS | HEART RATE: 72 BPM | TEMPERATURE: 97.7 F | RESPIRATION RATE: 16 BRPM | OXYGEN SATURATION: 99 % | HEIGHT: 62 IN
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
## 2017-05-02 DIAGNOSIS — G62.9 NEUROPATHY: ICD-10-CM

## 2017-05-02 DIAGNOSIS — I15.9 SECONDARY HYPERTENSION: ICD-10-CM

## 2017-05-02 DIAGNOSIS — G40.209 PARTIAL SYMPTOMATIC EPILEPSY WITH COMPLEX PARTIAL SEIZURES, NOT INTRACTABLE, WITHOUT STATUS EPILEPTICUS (HCC): ICD-10-CM

## 2017-05-02 DIAGNOSIS — M54.42 ACUTE BILATERAL LOW BACK PAIN WITH BILATERAL SCIATICA: ICD-10-CM

## 2017-05-02 DIAGNOSIS — M54.41 ACUTE BILATERAL LOW BACK PAIN WITH BILATERAL SCIATICA: ICD-10-CM

## 2017-05-02 DIAGNOSIS — R51.9 BILATERAL HEADACHES: ICD-10-CM

## 2017-05-02 DIAGNOSIS — M54.2 NECK PAIN: Primary | ICD-10-CM

## 2017-05-02 RX ORDER — CYCLOBENZAPRINE HCL 5 MG
TABLET ORAL 2 TIMES DAILY
COMMUNITY
Start: 2017-04-10 | End: 2017-06-01 | Stop reason: SDUPTHER

## 2017-05-02 RX ORDER — DIVALPROEX SODIUM 500 MG/1
250 TABLET, EXTENDED RELEASE ORAL
Qty: 30 TAB | Refills: 1 | Status: SHIPPED | OUTPATIENT
Start: 2017-05-02 | End: 2017-09-18 | Stop reason: SDUPTHER

## 2017-05-02 RX ORDER — INSULIN PUMP SYRINGE, 3 ML
EACH MISCELLANEOUS 2 TIMES DAILY
COMMUNITY

## 2017-05-02 RX ORDER — METHYLPREDNISOLONE 4 MG/1
TABLET ORAL
COMMUNITY
Start: 2017-02-17 | End: 2018-01-23

## 2017-05-02 RX ORDER — LANCETS
EACH MISCELLANEOUS 2 TIMES DAILY
COMMUNITY

## 2017-05-02 RX ORDER — DIVALPROEX SODIUM 250 MG/1
TABLET, EXTENDED RELEASE ORAL
COMMUNITY
Start: 2017-02-17 | End: 2017-05-02 | Stop reason: SDUPTHER

## 2017-05-02 RX ORDER — ASPIRIN 325 MG
TABLET, DELAYED RELEASE (ENTERIC COATED) ORAL
COMMUNITY

## 2017-05-02 RX ORDER — CALCIUM CITRATE/VITAMIN D3 200MG-6.25
TABLET ORAL
COMMUNITY
Start: 2017-04-27

## 2017-05-02 RX ORDER — LOSARTAN POTASSIUM AND HYDROCHLOROTHIAZIDE 12.5; 1 MG/1; MG/1
TABLET ORAL DAILY
COMMUNITY
Start: 2017-04-13

## 2017-05-02 RX ORDER — DEXTROAMPHETAMINE SACCHARATE, AMPHETAMINE ASPARTATE, DEXTROAMPHETAMINE SULFATE AND AMPHETAMINE SULFATE 7.5; 7.5; 7.5; 7.5 MG/1; MG/1; MG/1; MG/1
TABLET ORAL 2 TIMES DAILY
COMMUNITY
Start: 2017-04-24 | End: 2017-06-01 | Stop reason: SDUPTHER

## 2017-05-02 RX ORDER — LOSARTAN POTASSIUM AND HYDROCHLOROTHIAZIDE 12.5; 5 MG/1; MG/1
TABLET ORAL
COMMUNITY
Start: 2017-03-08 | End: 2017-05-02 | Stop reason: SDUPTHER

## 2017-05-02 RX ORDER — METOPROLOL TARTRATE 50 MG/1
TABLET ORAL 2 TIMES DAILY
COMMUNITY
Start: 2017-04-18 | End: 2017-09-18 | Stop reason: SDUPTHER

## 2017-05-02 RX ORDER — DIVALPROEX SODIUM 500 MG/1
TABLET, EXTENDED RELEASE ORAL
COMMUNITY
Start: 2017-04-10 | End: 2017-05-02 | Stop reason: SDUPTHER

## 2017-05-02 RX ORDER — DIAZEPAM 10 MG/1
TABLET ORAL
COMMUNITY
Start: 2017-03-26 | End: 2017-06-01 | Stop reason: SDUPTHER

## 2017-05-02 RX ORDER — METOPROLOL TARTRATE 25 MG/1
TABLET, FILM COATED ORAL
COMMUNITY
Start: 2017-03-08 | End: 2017-05-02 | Stop reason: SDUPTHER
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
## 2017-05-04 ENCOUNTER — HOSPITAL ENCOUNTER (OUTPATIENT)
Dept: ULTRASOUND IMAGING | Age: 52
Discharge: HOME | End: 2017-05-04
Attending: FAMILY MEDICINE

## 2017-05-04 DIAGNOSIS — I72.2: ICD-10-CM

## 2017-06-01 ENCOUNTER — OFFICE VISIT (OUTPATIENT)
Dept: NEUROLOGY | Age: 52
End: 2017-06-01

## 2017-06-01 VITALS
BODY MASS INDEX: 21.57 KG/M2 | TEMPERATURE: 96.5 F | WEIGHT: 117.2 LBS | OXYGEN SATURATION: 99 % | HEIGHT: 62 IN | DIASTOLIC BLOOD PRESSURE: 86 MMHG | RESPIRATION RATE: 16 BRPM | SYSTOLIC BLOOD PRESSURE: 136 MMHG | HEART RATE: 68 BPM

## 2017-06-01 DIAGNOSIS — R20.2 PARESTHESIA: ICD-10-CM

## 2017-06-01 DIAGNOSIS — M79.18 MYOFASCIAL MUSCLE PAIN: ICD-10-CM

## 2017-06-01 DIAGNOSIS — F98.8 ADD (ATTENTION DEFICIT DISORDER): ICD-10-CM

## 2017-06-01 DIAGNOSIS — Z76.0 MEDICATION REFILL: Primary | ICD-10-CM

## 2017-06-01 DIAGNOSIS — G40.209 PARTIAL SYMPTOMATIC EPILEPSY WITH COMPLEX PARTIAL SEIZURES, NOT INTRACTABLE, WITHOUT STATUS EPILEPTICUS (HCC): ICD-10-CM

## 2017-06-01 DIAGNOSIS — G89.4 CHRONIC PAIN DISORDER: ICD-10-CM

## 2017-06-01 DIAGNOSIS — G43.009 MIGRAINE WITHOUT AURA AND WITHOUT STATUS MIGRAINOSUS, NOT INTRACTABLE: ICD-10-CM

## 2017-06-01 DIAGNOSIS — F11.90 CHRONIC, CONTINUOUS USE OF OPIOIDS: ICD-10-CM

## 2017-06-01 RX ORDER — DEXTROAMPHETAMINE SACCHARATE, AMPHETAMINE ASPARTATE, DEXTROAMPHETAMINE SULFATE AND AMPHETAMINE SULFATE 7.5; 7.5; 7.5; 7.5 MG/1; MG/1; MG/1; MG/1
30 TABLET ORAL 2 TIMES DAILY
Qty: 60 TAB | Refills: 0 | Status: SHIPPED | OUTPATIENT
Start: 2017-06-01 | End: 2017-06-01 | Stop reason: SDUPTHER

## 2017-06-01 RX ORDER — DEXTROAMPHETAMINE SACCHARATE, AMPHETAMINE ASPARTATE, DEXTROAMPHETAMINE SULFATE AND AMPHETAMINE SULFATE 7.5; 7.5; 7.5; 7.5 MG/1; MG/1; MG/1; MG/1
30 TABLET ORAL 2 TIMES DAILY
Qty: 60 TAB | Refills: 0 | Status: SHIPPED | OUTPATIENT
Start: 2017-07-01 | End: 2017-09-18 | Stop reason: SDUPTHER

## 2017-06-01 RX ORDER — OXYCODONE AND ACETAMINOPHEN 10; 325 MG/1; MG/1
1 TABLET ORAL
Qty: 60 TAB | Refills: 0 | Status: SHIPPED | OUTPATIENT
Start: 2017-06-01 | End: 2017-06-01 | Stop reason: SDUPTHER

## 2017-06-01 RX ORDER — AMLODIPINE BESYLATE 5 MG/1
TABLET ORAL DAILY
COMMUNITY
Start: 2017-05-04 | End: 2017-09-18 | Stop reason: SDUPTHER

## 2017-06-01 RX ORDER — DIAZEPAM 10 MG/1
10 TABLET ORAL
Qty: 30 TAB | Refills: 3 | Status: SHIPPED | OUTPATIENT
Start: 2017-06-01 | End: 2017-09-18 | Stop reason: SDUPTHER

## 2017-06-01 RX ORDER — CYCLOBENZAPRINE HCL 5 MG
5 TABLET ORAL 2 TIMES DAILY
Qty: 60 TAB | Refills: 3 | Status: SHIPPED | OUTPATIENT
Start: 2017-06-01 | End: 2017-09-18 | Stop reason: SDUPTHER

## 2017-06-01 RX ORDER — OXYCODONE AND ACETAMINOPHEN 10; 325 MG/1; MG/1
1 TABLET ORAL
Qty: 60 TAB | Refills: 0 | Status: SHIPPED | OUTPATIENT
Start: 2017-07-01 | End: 2017-09-18 | Stop reason: SDUPTHER

## 2017-06-01 NOTE — MR AVS SNAPSHOT
Visit Information Date & Time Provider Department Dept. Phone Encounter #  
 6/1/2017 10:00 AM Duane Vasquez MD [facility] [phone] [id number] Follow-up Instructions Return in about 2 months (around 8/1/2017). Upcoming Health Maintenance Date Due  
 EYE EXAM RETINAL OR DILATED Q1 1/8/1973 Pneumococcal 19-64 Medium Risk (1 of 1 - PPSV23) 1/8/1982 FOBT Q 1 YEAR AGE 50-75 1/8/2013 DTaP/Tdap/Td series (1 - Tdap) 2/24/2016 FOOT EXAM Q1 2/18/2017 HEMOGLOBIN A1C Q6M 7/27/2017 INFLUENZA AGE 9 TO ADULT 8/1/2017 MICROALBUMIN Q1 9/7/2017 LIPID PANEL Q1 9/7/2017 PAP AKA CERVICAL CYTOLOGY 12/21/2018 BREAST CANCER SCRN MAMMOGRAM 3/23/2019 Allergies as of 6/1/2017  Review Complete On: 6/1/2017 By: Rodrigo Lara MD  
 No Known Allergies Current Immunizations  Reviewed on 2/23/2016 Name Date Td, Adsorbed PF 2/23/2016 Not reviewed this visit You Were Diagnosed With   
  
 Codes Comments Medication refill    -  Primary ICD-10-CM: Z76.0 ICD-9-CM: V68.1 Chronic, continuous use of opioids     ICD-10-CM: F11.90 ICD-9-CM: 305.51 Partial symptomatic epilepsy with complex partial seizures, not intractable, without status [facility]ptic[facility]     ICD-10-CM: V65.235 ICD-9-CM: 345.40 Migraine without aura and without status migrainosus, not intractable     ICD-10-CM: B94.180 ICD-9-CM: 346.10 Myofascial muscle pain     ICD-10-CM: M79.1 ICD-9-CM: 729.1 Paresthesia     ICD-10-CM: R20.2 ICD-9-CM: 532. 0 Chronic pain disorder     ICD-10-CM: G89.4 ICD-9-CM: 338.4 ADD (attention deficit disorder)     ICD-10-CM: F98.8 ICD-9-CM: 314.00 Vitals BP Pulse Temp Resp Height(growth percentile) Weight(growth percentile) 136/86 (BP 1 Location: Left arm, BP Patient Position: Sitting) 68 96.5 °F (35.8 °C) (Oral) 16 5' 2\" (1.575 m) 117 lb 3.2 oz (53.2 kg) LMP SpO2 BMI OB Status Smoking Status 11/01/2003 (LMP Unknown) 99% 21.44 kg/m2 Hysterectomy Never Smoker Vitals History BMI and BSA Data Body Mass Index Body Surface Area  
 21.44 kg/m 2 1.53 m 2 Preferred Pharmacy Pharmacy Name Phone [facility] [phone] [facility] [phone] Your Updated Medication List  
  
   
This list is accurate as of: 6/1/17 10:55 AM.  Always use your most recent med list. amLODIPine 5 mg tablet Commonly known as:  NORVASC  
daily. atorvastatin 10 mg tablet Commonly known as:  LIPITOR Take 10 mg by mouth nightly. Cholecalciferol (Vitamin D3) 50,000 unit Cap Take  by mouth every month. cyclobenzaprine 5 mg tablet Commonly known as:  FLEXERIL Take 1 Tab by mouth two (2) times a day. dextroamphetamine-amphetamine 30 mg tablet Commonly known as:  ADDERALL Take 1 Tab by mouth two (2) times a dayEarliest Fill Date: 7/1/17. Max Daily Amount: 2 Tabs Start taking on:  7/1/2017  
  
 diazePAM 10 mg tablet Commonly known as:  VALIUM Take 1 Tab by mouth nightly. Max Daily Amount: 10 mg.  
  
 divalproex  mg ER tablet Commonly known as:  DEPAKOTE ER Take 1 Tab by mouth nightly. Lancets Misc  
by Does Not Apply route two (2) times a day. LEXAPRO 20 mg tablet Generic drug:  escitalopram oxalate Take 20 mg by mouth daily. losartan-hydroCHLOROthiazide 100-12.5 mg per tablet Commonly known as:  HYZAAR  
daily. metFORMIN 500 mg tablet Commonly known as:  GLUCOPHAGE Take 500 mg by mouth two (2) times daily (with meals). methylPREDNISolone 4 mg tablet Commonly known as:  MEDROL DOSEPACK  
  
 metoprolol tartrate 50 mg tablet Commonly known as:  LOPRESSOR  
two (2) times a day. 2 tablets  
  
 omeprazole 20 mg capsule Commonly known as:  PRILOSEC 1 po bid prn  
  
 oxyCODONE-acetaminophen  mg per tablet Commonly known as:  PERCOCET 10 Take 1 Tab by mouth every eight (8) hours as needed for Pain. Max Daily Amount: 3 Tabs. Start taking on:  7/1/2017 TOPAMAX 100 mg tablet Generic drug:  topiramate Take 100 mg by mouth daily. TRUE METRIX AIR GLUCOSE METER monitoring kit Generic drug:  Blood-Glucose Meter  
by Does Not Apply route two (2) times a day. TRUE METRIX GLUCOSE TEST STRIP strip Generic drug:  glucose blood VI test strips Prescriptions Printed Refills  
 diazePAM (VALIUM) 10 mg tablet 3 Sig: Take 1 Tab by mouth nightly. Max Daily Amount: 10 mg.  
 Class: Print Route: Oral  
 oxyCODONE-acetaminophen (PERCOCET 10)  mg per tablet 0 Starting on: 7/1/2017 Sig: Take 1 Tab by mouth every eight (8) hours as needed for Pain. Max Daily Amount: 3 Tabs. Class: Print Route: Oral  
 dextroamphetamine-amphetamine (ADDERALL) 30 mg tablet 0 Starting on: 7/1/2017 Sig: Take 1 Tab by mouth two (2) times a dayEarliest Fill Date: 7/1/17. Max Daily Amount: 2 Tabs Class: Print Route: Oral  
  
Prescriptions Sent to Pharmacy Refills  
 cyclobenzaprine (FLEXERIL) 5 mg tablet 3 Sig: Take 1 Tab by mouth two (2) times a day. Class: Normal  
 Pharmacy: [facility] [id number] [address] #: [phone] Route: Oral  
  
We Performed the Following [facility] MONITORING [[id number] Custom] Follow-up Instructions Return in about 2 months (around 8/1/2017). Introducing [facility]! Dear José Luis Israel: Thank you for requesting a [employer] account. Our records indicate that you already have an active [employer] account. You can access your account anytime at [email or web address] Did you know that you can access your hospital and ER discharge instructions at any time in Lakala? You can also review all of your test results from your hospital stay or ER visit. Additional Information If you have questions, please visit the Frequently Asked Questions section of the [employer] website at [email or web address] Remember, [employer] is NOT to be used for urgent needs. For medical emergencies, dial 911. Now available from your iPhone and Android! Please provide this summary of care documentation to your next provider. Your primary care clinician is listed as Anotn Norris. If you have any questions after today's visit, please call [phone].

## 2017-06-01 NOTE — PROGRESS NOTES
Chief Complaint   Patient presents with    Neck Pain    Back Pain    Medication Refill     1. Have you been to the ER, urgent care clinic since your last visit? Hospitalized since your last visit? no    2. Have you seen or consulted any other health care providers outside of the [address] [place] [address] since your last visit? Include any pap smears or colon screening.  no

## 2017-06-01 NOTE — PROGRESS NOTES
Neurology Progress Note    NAME:  Rayshawn Greene   :   1965   MRN:   [id number]     Date/Time:  2017  Subjective:      Rayshawn Greene is a 45 y.o. female here today for follow up. Headache has improved. Says headache waxes and wanes, has not been as severe as previous. Headache is throbbing in nature, associated with nausea. and occasional dizziness. No seizure reported since last visit, says sometimes she feels confused but not as frequent as before. Denies LOC, dysphagia,odynophagia, diarrhea constipation, dysuria, hematuria, hematochezia,. Admits joint and muscle pain. Review of Systems:   Neurological ROS: positive for - confusion, dizziness, headaches, numbness/tingling and weakness         []Unable to obtain  ROS due to  []mental status change  []sedated   []intubated    Medications reviewed:  Current Outpatient Prescriptions   Medication Sig Dispense Refill    amLODIPine (NORVASC) 5 mg tablet daily.  diazePAM (VALIUM) 10 mg tablet Take 1 Tab by mouth nightly. Max Daily Amount: 10 mg. 30 Tab 3    cyclobenzaprine (FLEXERIL) 5 mg tablet Take 1 Tab by mouth two (2) times a day. 60 Tab 3    [START ON 2017] oxyCODONE-acetaminophen (PERCOCET 10)  mg per tablet Take 1 Tab by mouth every eight (8) hours as needed for Pain. Max Daily Amount: 3 Tabs. 60 Tab 0    [START ON 2017] dextroamphetamine-amphetamine (ADDERALL) 30 mg tablet Take 1 Tab by mouth two (2) times a dayEarliest Fill Date: 17. Max Daily Amount: 2 Tabs 60 Tab 0    TRUE METRIX GLUCOSE TEST STRIP strip       losartan-hydroCHLOROthiazide (HYZAAR) 100-12.5 mg per tablet daily.  metoprolol tartrate (LOPRESSOR) 50 mg tablet two (2) times a day. 2 tablets      Cholecalciferol, Vitamin D3, 50,000 unit cap Take  by mouth every month.  Lancets misc by Does Not Apply route two (2) times a day.       Blood-Glucose Meter (TRUE METRIX AIR GLUCOSE METER) monitoring kit by Does Not Apply route two (2) times a day.      divalproex ER (DEPAKOTE ER) 500 mg ER tablet Take 1 Tab by mouth nightly. 30 Tab 1    atorvastatin (LIPITOR) 10 mg tablet Take 10 mg by mouth nightly.  topiramate (TOPAMAX) 100 mg tablet Take 100 mg by mouth daily.  methylPREDNISolone (MEDROL DOSEPACK) 4 mg tablet       metFORMIN (GLUCOPHAGE) 500 mg tablet Take 500 mg by mouth two (2) times daily (with meals).  omeprazole (PRILOSEC) 20 mg capsule 1 po bid prn 60 Cap 5    escitalopram oxalate (LEXAPRO) 20 mg tablet Take 20 mg by mouth daily.           Objective:   Vitals:  Vitals:    06/01/17 1016   BP: 136/86   Pulse: 68   Resp: 16   Temp: 96.5 °F (35.8 °C)   TempSrc: Oral   SpO2: 99%   Weight: 117 lb 3.2 oz (53.2 kg)   Height: 5' 2\" (1.575 m)   PainSc:   4   PainLoc: Back   LMP: 11/01/2003               Lab Data Reviewed:  Lab Results   Component Value Date/Time    WBC 5.9 01/27/2017 01:45 PM    HCT 41.3 01/27/2017 01:45 PM    HGB 13.7 01/27/2017 01:45 PM    PLATELET 047 77/65/6809 01:45 PM       Lab Results   Component Value Date/Time    Sodium 143 01/27/2017 01:45 PM    Potassium 3.3 01/27/2017 01:45 PM    Chloride 105 01/27/2017 01:45 PM    CO2 26 01/27/2017 01:45 PM    Glucose 93 01/27/2017 01:45 PM    BUN 26 01/27/2017 01:45 PM    Creatinine 1.12 01/27/2017 01:45 PM    Calcium 8.9 01/27/2017 01:45 PM       No components found for: TROPQUANT    No results found for: KRIS      Lab Results   Component Value Date/Time    Hemoglobin A1c 6.4 01/27/2017 01:45 PM    Hemoglobin A1c (POC) 9.4 05/25/2016 10:42 AM        Lab Results   Component Value Date/Time    Vitamin B12 1367 11/05/2016 05:20 AM    Folate 20.3 11/05/2016 05:20 AM       No results found for: Jaguar PONCE XBANA    Lab Results   Component Value Date/Time    Cholesterol, total 200 02/23/2016 11:00 AM    HDL Cholesterol 75 02/23/2016 11:00 AM    LDL, calculated 106 02/23/2016 11:00 AM    VLDL, calculated 19 02/23/2016 11:00 AM    Triglyceride 94 02/23/2016 11:00 AM CT Results (recent):    Results from Hospital Encounter encounter on 01/27/17   CT HEAD WO CONT   Narrative CLINICAL HISTORY: Syncope    INDICATION: Syncope    COMPARISON: 11/4/2016      CT dose reduction was achieved through use of a standardized protocol tailored  for this examination and automatic exposure control for dose modulation. TECHNIQUE: Serial axial images with a collimation of 5 mm were obtained from the  skull base through the vertex      FINDINGS: The ventricles, cisterns, and cortical sulci are normal. The  gray-white matter demarcation is normal. No abnormal mass is identified. There  is no evidence of an acute infarction, hemorrhage, or mass-effect. There is no  evidence of midline shift or hydrocephalus. Posterior fossa structures are  unremarkable. No extra-axial collections are seen. Mastoid air cells and the visualized paranasal sinuses are well pneumatized and  clear. There is no evidence of depressed skull fractures of soft tissue  swelling. Impression IMPRESSION:     Normal CT scan of the head. MRI Results (recent):    Results from [place] encounter on 07/19/16   MRI CERV SPINE WO CONT   Narrative **Final Report**      ICD Codes / Adm. Diagnosis: 723.4  782.0 / Brachial neuritis or radiculit    Disturbance of skin sensatio  Examination:  MR ESPINAL SPINE WO CON  - [id number] - Jul 19 2016  7:33AM  Accession No:  [id number]  Reason:  cervical radiculopathy, d/o sensation      REPORT:  Study: MR ESPINAL SPINE WO CON    Indication:  cervical radiculopathy, d/o sensation    Additional clinical history: Brachial neuritis or radiculit Disturbance of   skin sensation    Comparison:  5/2/2013    Contrast: None administered    Technique:  Magnetic resonance images of the cervical spine were obtained. The following sequences were obtained: Sagittal T1, T2, T2 STIR; axial T1,   gradient echo, and T2. Findings:   There is straightening of normal cervical lordosis. There is no subluxation. Minimal to mild disc space narrowing is seen from C2-C3 through C5-C6. Minimal osteophytic bony endplate changes are seen at multiple levels. Normal signal is seen in the spinal cord and canal. No concerning signal   changes are seen in the visualized paraspinal soft tissues. C2-C3: No neuroforaminal narrowing or spinal canal stenosis. C3-C4: No neuroforaminal narrowing or spinal canal stenosis. C4-C5: Minimal disc bulge. No neuroforaminal narrowing or spinal canal   stenosis. C5-C6: Small disc osteophyte complex mildly effacing the ventral thecal sac,   unchanged. No neuroforaminal narrowing or spinal canal stenosis. C6-C7: Minimal disc bulge. No neuroforaminal narrowing or spinal canal   stenosis. C7-T1: No neuroforaminal narrowing or spinal canal stenosis. IMPRESSION:  Minimal to mild multilevel degenerative disc disease, worst at C5-C6,   unchanged. No significant neuroforaminal narrowing or spinal canal stenosis. Signing/Reading Doctor: Jonathan Funez ([id number])    Approved: Jonathan Funez ([id number])  Jul 19 2016  8:34AM                               IR Results (recent):  No results found for this or any previous visit. VAS/US Results (recent):  No results found for this or any previous visit. PHYSICAL EXAM:  General:    Alert, cooperative, mild painful distress, appears stated age. Head:   Normocephalic, without obvious abnormality, atraumatic. Eyes:   Conjunctivae/corneas clear. PERRLA  Nose:  Nares normal. No drainage or sinus tenderness. Throat:    Lips, mucosa, and tongue normal.  No Thrush  Neck:  Supple, symmetrical,  no adenopathy, thyroid: non tender    no carotid bruit and no JVD. Back:    Symmetric,  No CVA tenderness. Lungs:   Clear to auscultation bilaterally. No Wheezing or Rhonchi. No rales. Chest wall:  No tenderness or deformity. No Accessory muscle use.   Heart:   Regular rate and rhythm,  no murmur, rub or gallop. Abdomen:   Soft, non-tender. Not distended. Bowel sounds normal. No masses  Extremities: Extremities normal, atraumatic, No cyanosis. No edema. No clubbing  Skin:     Texture, turgor normal. No rashes or lesions. Not Jaundiced  Lymph nodes: Cervical, supraclavicular normal.  Psych:  Good insight. Not depressed. Not anxious or agitated. NEUROLOGICAL EXAM:  Appearance: The patient is well developed, well nourished, provides a coherent history and is in mild painfu distress. Mental Status: Oriented to time, place and person. Mood and affect appropriate. Cranial Nerves:   Intact visual fields. Fundi are benign. BENIGNO, EOM's full, no nystagmus, no ptosis. Facial sensation is normal. Corneal reflexes are intact. Facial movement is symmetric. Hearing is normal bilaterally. Palate is midline with normal sternocleidomastoid and trapezius muscles are normal. Tongue is midline. Motor:  5/5 strength in upper and lower proximal and distal muscles. Normal bulk and tone. No fasciculations. Reflexes:   Deep tendon reflexes 2+/4 and symmetrical.   Sensory:   Normal to touch, pinprick and vibration. Gait:  Normal gait. Tremor:   No tremor noted. Cerebellar:  No cerebellar signs present. Neurovascular:  Normal heart sounds and regular rhythm, peripheral pulses intact, and no carotid bruits. Assesment  1. Medication refill    - diazePAM (VALIUM) 10 mg tablet; nightly. - oxyCODONE-acetaminophen (PERCOCET 10)  mg per tablet; Take 1 Tab by mouth every six (6) hours as needed for Pain. Max Daily Amount: 4 Tabs. - cyclobenzaprine (FLEXERIL) 5 mg tablet; two (2) times a day. - dextroamphetamine-amphetamine (ADDERALL) 30 mg tablet; two (2) times a day. 2. Chronic, continuous use of opioids    - COMPLIANCE DRUG SCREEN/PRESCRIPTION MONITORING    ___________________________________________________  PLAN:Continue current mangement      ICD-10-CM ICD-9-CM    1.  Medication refill Z76.0 V68.1 diazePAM (VALIUM) 10 mg tablet      cyclobenzaprine (FLEXERIL) 5 mg tablet      oxyCODONE-acetaminophen (PERCOCET 10)  mg per tablet      dextroamphetamine-amphetamine (ADDERALL) 30 mg tablet      DISCONTINUED: oxyCODONE-acetaminophen (PERCOCET 10)  mg per tablet      DISCONTINUED: dextroamphetamine-amphetamine (ADDERALL) 30 mg tablet   2. Chronic, continuous use of opioids F11.90 305.51 COMPLIANCE DRUG SCREEN/PRESCRIPTION MONITORING   3. Partial symptomatic epilepsy with complex partial seizures, not intractable, without status epilepticus ([facility] G40.209 345.40    4. Migraine without aura and without status migrainosus, not intractable G43.009 346.10    5. Myofascial muscle pain M79.1 729.1    6. Paresthesia R20.2 782.0    7. Chronic pain disorder G89.4 338.4    8. ADD (attention deficit disorder) F98.8 314.00      Follow-up Disposition:  Return in about 2 months (around 8/1/2017).            ___________________________________________________    Total time spent with patient:  []15   []25   []35   [] __ minutes    Care Plan discussed with:    []Patient   []Family    []Care Manager   []Consultant/Specialist :    ___________________________________________________    Attending Physician: Qasim Mcgovern MD

## 2017-06-08 LAB — DRUGS UR: NORMAL

## 2017-09-18 ENCOUNTER — OFFICE VISIT (OUTPATIENT)
Dept: NEUROLOGY | Age: 52
End: 2017-09-18

## 2017-09-18 VITALS
SYSTOLIC BLOOD PRESSURE: 120 MMHG | BODY MASS INDEX: 21.64 KG/M2 | RESPIRATION RATE: 16 BRPM | TEMPERATURE: 98.1 F | DIASTOLIC BLOOD PRESSURE: 80 MMHG | HEART RATE: 67 BPM | HEIGHT: 62 IN | WEIGHT: 117.6 LBS | OXYGEN SATURATION: 99 %

## 2017-09-18 DIAGNOSIS — G40.909 SEIZURE DISORDER (HCC): Primary | ICD-10-CM

## 2017-09-18 DIAGNOSIS — G62.9 NEUROPATHY: ICD-10-CM

## 2017-09-18 DIAGNOSIS — F98.8 ADD (ATTENTION DEFICIT DISORDER): ICD-10-CM

## 2017-09-18 DIAGNOSIS — F11.90 CHRONIC, CONTINUOUS USE OF OPIOIDS: ICD-10-CM

## 2017-09-18 DIAGNOSIS — G43.009 MIGRAINE WITHOUT AURA AND WITHOUT STATUS MIGRAINOSUS, NOT INTRACTABLE: ICD-10-CM

## 2017-09-18 DIAGNOSIS — Z76.0 MEDICATION REFILL: ICD-10-CM

## 2017-09-18 RX ORDER — AMLODIPINE BESYLATE 10 MG/1
TABLET ORAL DAILY
COMMUNITY
Start: 2017-06-15

## 2017-09-18 RX ORDER — OXYCODONE AND ACETAMINOPHEN 10; 325 MG/1; MG/1
1 TABLET ORAL
Qty: 60 TAB | Refills: 0 | Status: SHIPPED | OUTPATIENT
Start: 2017-09-18 | End: 2017-12-15 | Stop reason: SDUPTHER

## 2017-09-18 RX ORDER — DIAZEPAM 10 MG/1
10 TABLET ORAL
Qty: 30 TAB | Refills: 3 | Status: SHIPPED | OUTPATIENT
Start: 2017-09-18

## 2017-09-18 RX ORDER — METOPROLOL TARTRATE 100 MG/1
50 TABLET ORAL 2 TIMES DAILY
COMMUNITY
Start: 2017-08-11

## 2017-09-18 RX ORDER — DEXTROAMPHETAMINE SACCHARATE, AMPHETAMINE ASPARTATE, DEXTROAMPHETAMINE SULFATE AND AMPHETAMINE SULFATE 7.5; 7.5; 7.5; 7.5 MG/1; MG/1; MG/1; MG/1
30 TABLET ORAL 2 TIMES DAILY
Qty: 60 TAB | Refills: 0 | Status: SHIPPED | OUTPATIENT
Start: 2017-10-18 | End: 2017-12-15 | Stop reason: SDUPTHER

## 2017-09-18 RX ORDER — POLYETHYLENE GLYCOL 3350 17 G/17G
POWDER, FOR SOLUTION ORAL AS NEEDED
COMMUNITY
Start: 2017-08-11

## 2017-09-18 RX ORDER — CEPHALEXIN 500 MG/1
CAPSULE ORAL
COMMUNITY
Start: 2017-08-17 | End: 2017-09-18 | Stop reason: ALTCHOICE

## 2017-09-18 RX ORDER — DIVALPROEX SODIUM 500 MG/1
250 TABLET, EXTENDED RELEASE ORAL
Qty: 30 TAB | Refills: 1 | Status: SHIPPED | OUTPATIENT
Start: 2017-09-18 | End: 2017-09-18 | Stop reason: SDUPTHER

## 2017-09-18 RX ORDER — DIVALPROEX SODIUM 500 MG/1
500 TABLET, DELAYED RELEASE ORAL 2 TIMES DAILY
Qty: 60 TAB | Refills: 2 | Status: SHIPPED | OUTPATIENT
Start: 2017-09-18 | End: 2018-01-24 | Stop reason: SDUPTHER

## 2017-09-18 RX ORDER — DEXTROAMPHETAMINE SACCHARATE, AMPHETAMINE ASPARTATE, DEXTROAMPHETAMINE SULFATE AND AMPHETAMINE SULFATE 7.5; 7.5; 7.5; 7.5 MG/1; MG/1; MG/1; MG/1
30 TABLET ORAL 2 TIMES DAILY
Qty: 60 TAB | Refills: 0 | Status: SHIPPED | OUTPATIENT
Start: 2017-09-18 | End: 2017-09-18 | Stop reason: SDUPTHER

## 2017-09-18 NOTE — PROGRESS NOTES
Chief Complaint   Patient presents with    Seizure    Neck Pain     1. Have you been to the ER, urgent care clinic since your last visit? Hospitalized since your last visit? No    2. Have you seen or consulted any other health care providers outside of the [address] [place] [address] since your last visit? Include any pap smears or colon screening.  No    Pill count  performed  Pill count verified with patient  Patient reports taking medication    UDS obtained and sent   Pain contract on file   made available for Dr. Cristiane Murphy given for Adderall 30 mg, Percocet 10/325 mg, and Valium 10 mg

## 2017-09-18 NOTE — PROGRESS NOTES
Neurology Progress Note    NAME:  Tiffany Rodriguez   :   1965   MRN:   [id number]     Date/Time:  2017  Subjective:      Tiffany Rodriguez is a 45 y.o. female here today for follow up for headache and seizures. Pain has been waxing and waning, activity tend to aggravate the pain. Says headache frequency about 1x/week, throbbing in nature, occasional nausea, no vomiting. Back pain tend to have increased lately, pain is sharp in nature, tend to radiate to the legs, legs tend to give out on her. Said that she blacked  Out, but did not appear to be seizure as it was noted that her Blood pressure had dropped. No overt seizure reported    Review of Systems:   Constitutional: positive for fatigue and anorexia  Eyes: negative  Ears, nose, mouth, throat, and face: negative  Respiratory: negative  Cardiovascular: negative  Gastrointestinal: positive for dyspepsia, reflux symptoms, nausea and vomiting  Genitourinary:negative  Integument/breast: negative  Hematologic/lymphatic: negative  Musculoskeletal:positive for myalgias, arthralgias, neck pain, back pain and muscle weakness  Neurological: positive for headaches, dizziness, paresthesia, gait problems and weakness  Behavioral/Psych: positive for anxiety  Endocrine: negative  Allergic/Immunologic: negative      Medications reviewed:  Current Outpatient Prescriptions   Medication Sig Dispense Refill    amLODIPine (NORVASC) 10 mg tablet daily.  metoprolol tartrate (LOPRESSOR) 100 mg IR tablet       polyethylene glycol (MIRALAX) 17 gram/dose powder       oxyCODONE-acetaminophen (PERCOCET 10)  mg per tablet Take 1 Tab by mouth every eight (8) hours as needed for Pain. Max Daily Amount: 3 Tabs. 60 Tab 0    diazePAM (VALIUM) 10 mg tablet Take 1 Tab by mouth nightly.  Max Daily Amount: 10 mg. 30 Tab 3    [START ON 10/18/2017] dextroamphetamine-amphetamine (ADDERALL) 30 mg tablet Take 1 Tab by mouth two (2) times a dayEarliest Fill Date: 10/18/17. Max Daily Amount: 2 Tabs 60 Tab 0    divalproex DR (DEPAKOTE) 500 mg tablet Take 1 Tab by mouth two (2) times a day. 60 Tab 2    TRUE METRIX GLUCOSE TEST STRIP strip       losartan-hydroCHLOROthiazide (HYZAAR) 100-12.5 mg per tablet daily.  Cholecalciferol, Vitamin D3, 50,000 unit cap Take  by mouth every month.  Lancets misc by Does Not Apply route two (2) times a day.  Blood-Glucose Meter (TRUE METRIX AIR GLUCOSE METER) monitoring kit by Does Not Apply route two (2) times a day.  atorvastatin (LIPITOR) 10 mg tablet Take 10 mg by mouth nightly.  methylPREDNISolone (MEDROL DOSEPACK) 4 mg tablet       metFORMIN (GLUCOPHAGE) 500 mg tablet Take 500 mg by mouth two (2) times daily (with meals).  omeprazole (PRILOSEC) 20 mg capsule 1 po bid prn 60 Cap 5    escitalopram oxalate (LEXAPRO) 20 mg tablet Take 20 mg by mouth daily.  topiramate (TOPAMAX) 100 mg tablet Take 100 mg by mouth daily.           Objective:   Vitals:  Vitals:    09/18/17 0958 09/18/17 1029   BP: (!) 140/100 120/80   Pulse: 67    Resp: 16    Temp: 98.1 °F (36.7 °C)    TempSrc: Oral    SpO2: 99%    Weight: 117 lb 9.6 oz (53.3 kg)    Height: 5' 2\" (1.575 m)    PainSc:   3    PainLoc: Head    LMP: 11/01/2003               Lab Data Reviewed:  Lab Results   Component Value Date/Time    WBC 5.9 01/27/2017 01:45 PM    HCT 41.3 01/27/2017 01:45 PM    HGB 13.7 01/27/2017 01:45 PM    PLATELET 801 44/19/3896 01:45 PM       Lab Results   Component Value Date/Time    Sodium 143 01/27/2017 01:45 PM    Potassium 3.3 01/27/2017 01:45 PM    Chloride 105 01/27/2017 01:45 PM    CO2 26 01/27/2017 01:45 PM    Glucose 93 01/27/2017 01:45 PM    BUN 26 01/27/2017 01:45 PM    Creatinine 1.12 01/27/2017 01:45 PM    Calcium 8.9 01/27/2017 01:45 PM       No components found for: TROPQUANT    No results found for: KRIS      Lab Results   Component Value Date/Time    Hemoglobin A1c 6.4 01/27/2017 01:45 PM    Hemoglobin A1c (POC) 9.4 05/25/2016 10:42 AM        Lab Results   Component Value Date/Time    Vitamin B12 1367 11/05/2016 05:20 AM    Folate 20.3 11/05/2016 05:20 AM       No results found for: Gulshan PONCE, KE    Lab Results   Component Value Date/Time    Cholesterol, total 200 02/23/2016 11:00 AM    HDL Cholesterol 75 02/23/2016 11:00 AM    LDL, calculated 106 02/23/2016 11:00 AM    VLDL, calculated 19 02/23/2016 11:00 AM    Triglyceride 94 02/23/2016 11:00 AM         CT Results (recent):    Results from [place] encounter on 01/27/17   CT HEAD WO CONT   Narrative CLINICAL HISTORY: Syncope    INDICATION: Syncope    COMPARISON: 11/4/2016      CT dose reduction was achieved through use of a standardized protocol tailored  for this examination and automatic exposure control for dose modulation. TECHNIQUE: Serial axial images with a collimation of 5 mm were obtained from the  skull base through the vertex      FINDINGS: The ventricles, cisterns, and cortical sulci are normal. The  gray-white matter demarcation is normal. No abnormal mass is identified. There  is no evidence of an acute infarction, hemorrhage, or mass-effect. There is no  evidence of midline shift or hydrocephalus. Posterior fossa structures are  unremarkable. No extra-axial collections are seen. Mastoid air cells and the visualized paranasal sinuses are well pneumatized and  clear. There is no evidence of depressed skull fractures of soft tissue  swelling. Impression IMPRESSION:     Normal CT scan of the head. MRI Results (recent):    Results from [place] encounter on 07/19/16   MRI CERV SPINE WO CONT   Narrative **Final Report**      ICD Codes / Adm. Diagnosis: 723.4  782.0 / Brachial neuritis or radiculit    Disturbance of skin sensatio  Examination:  MR Martin Clifford CON  - [id number] - Jul 19 2016  7:33AM  Accession No:  [id number]  Reason:  cervical radiculopathy, d/o sensation      REPORT:  Study: MR ESPINAL SPINE WO CON    Indication:  cervical radiculopathy, d/o sensation    Additional clinical history: Brachial neuritis or radiculit Disturbance of   skin sensation    Comparison:  5/2/2013    Contrast: None administered    Technique:  Magnetic resonance images of the cervical spine were obtained. The following sequences were obtained: Sagittal T1, T2, T2 STIR; axial T1,   gradient echo, and T2. Findings: There is straightening of normal cervical lordosis. There is no subluxation. Minimal to mild disc space narrowing is seen from C2-C3 through C5-C6. Minimal osteophytic bony endplate changes are seen at multiple levels. Normal signal is seen in the spinal cord and canal. No concerning signal   changes are seen in the visualized paraspinal soft tissues. C2-C3: No neuroforaminal narrowing or spinal canal stenosis. C3-C4: No neuroforaminal narrowing or spinal canal stenosis. C4-C5: Minimal disc bulge. No neuroforaminal narrowing or spinal canal   stenosis. C5-C6: Small disc osteophyte complex mildly effacing the ventral thecal sac,   unchanged. No neuroforaminal narrowing or spinal canal stenosis. C6-C7: Minimal disc bulge. No neuroforaminal narrowing or spinal canal   stenosis. C7-T1: No neuroforaminal narrowing or spinal canal stenosis. IMPRESSION:  Minimal to mild multilevel degenerative disc disease, worst at C5-C6,   unchanged. No significant neuroforaminal narrowing or spinal canal stenosis. Signing/Reading Doctor: Nicci Davies ([id number])    Approved: Nicci Davies ([id number])  Jul 19 2016  8:34AM                               IR Results (recent):  No results found for this or any previous visit. VAS/US Results (recent):  No results found for this or any previous visit. PHYSICAL EXAM:  General:    Alert, cooperative, no distress, appears stated age. Head:   Normocephalic, without obvious abnormality, atraumatic. Eyes:   Conjunctivae/corneas clear. PERRLA  Nose:  Nares normal. No drainage or sinus tenderness. Throat:    Lips, mucosa, and tongue normal.  No Thrush  Neck:  Supple, symmetrical,  no adenopathy, thyroid: non tender    no carotid bruit and no JVD. Back:    Symmetric,  No CVA tenderness. Lungs:   Clear to auscultation bilaterally. No Wheezing or Rhonchi. No rales. Chest wall:  No tenderness or deformity. No Accessory muscle use. Heart:   Regular rate and rhythm,  no murmur, rub or gallop. Abdomen:   Soft, non-tender. Not distended. Bowel sounds normal. No masses  Extremities: Extremities normal, atraumatic, No cyanosis. No edema. No clubbing  Skin:     Texture, turgor normal. No rashes or lesions. Not Jaundiced  Lymph nodes: Cervical, supraclavicular normal.  Psych:  Good insight. Not depressed. Not anxious or agitated. NEUROLOGICAL EXAM:  Appearance: The patient is well developed, well nourished, provides a coherent history and is in no acute distress. Mental Status: Oriented to time, place and person. Mood and affect appropriate. Cranial Nerves:   Intact visual fields. Fundi are benign. BENIGNO, EOM's full, no nystagmus, no ptosis. Facial sensation is normal. Corneal reflexes are intact. Facial movement is symmetric. Hearing is normal bilaterally. Palate is midline with normal sternocleidomastoid and trapezius muscles are normal. Tongue is midline. Motor:  5/5 strength in upper and lower proximal and distal muscles. Normal bulk and tone. No fasciculations. Reflexes:   Deep tendon reflexes 2+/4 and symmetrical.   Sensory:   Decreased sensation  to touch, pinprick and vibration. Gait:  Slightly unsteady gait. Tremor:   No tremor noted. Cerebellar:  No cerebellar signs present. Neurovascular:  Normal heart sounds and regular rhythm, peripheral pulses intact, and no carotid bruits. Assesment  1. Medication refill    - oxyCODONE-acetaminophen (PERCOCET 10)  mg per tablet;  Take 1 Tab by mouth every eight (8) hours as needed for Pain. Max Daily Amount: 3 Tabs. Dispense: 60 Tab; Refill: 0  - dextroamphetamine-amphetamine (ADDERALL) 30 mg tablet; Take 1 Tab by mouth two (2) times a day. Max Daily Amount: 2 Tabs  Dispense: 60 Tab; Refill: 0  - diazePAM (VALIUM) 10 mg tablet; Take 1 Tab by mouth nightly. Max Daily Amount: 10 mg. Dispense: 30 Tab; Refill: 3    2. Seizure disorder ([facility]  Continue current medication  - COMPLIANCE DRUG SCREEN/PRESCRIPTION MONITORING    3. Chronic, continuous use of opioids    - COMPLIANCE DRUG SCREEN/PRESCRIPTION MONITORING    ___________________________________________________  PLAN:Medication reviewed with patient. ICD-10-CM ICD-9-CM    1. Seizure disorder ([facility] G40.909 345.90 COMPLIANCE DRUG SCREEN/PRESCRIPTION MONITORING   2. Medication refill Z76.0 V68.1 oxyCODONE-acetaminophen (PERCOCET 10)  mg per tablet      diazePAM (VALIUM) 10 mg tablet      dextroamphetamine-amphetamine (ADDERALL) 30 mg tablet      DISCONTINUED: dextroamphetamine-amphetamine (ADDERALL) 30 mg tablet   3. Chronic, continuous use of opioids F11.90 305.51 COMPLIANCE DRUG SCREEN/PRESCRIPTION MONITORING   4. Migraine without aura and without status migrainosus, not intractable G43.009 346.10    5. ADD (attention deficit disorder) F98.8 314.00    6. Neuropathy ([facility] G62.9 355.9      Follow-up Disposition:  Return in about 3 months (around 12/18/2017).            ___________________________________________________    Total time spent with patient:  []15   []25   []35   [] __ minutes    Care Plan discussed with:    [x]Patient   []Family    []Care Manager   []Consultant/Specialist :    ___________________________________________________    Attending Physician: David Haines MD

## 2017-09-19 ENCOUNTER — TELEPHONE (OUTPATIENT)
Dept: NEUROLOGY | Age: 52
End: 2017-09-19

## 2017-09-19 NOTE — TELEPHONE ENCOUNTER
Need 9/18/17 office visit faxed to him. Still unsigned. He needs ov in order to process referral for December visit which she has already requested.   Please fax to [phone] attn:  Henrry Nix

## 2017-09-25 LAB — DRUGS UR: NORMAL

## 2017-12-15 ENCOUNTER — OFFICE VISIT (OUTPATIENT)
Dept: NEUROLOGY | Age: 52
End: 2017-12-15

## 2017-12-15 VITALS
TEMPERATURE: 98 F | SYSTOLIC BLOOD PRESSURE: 133 MMHG | HEIGHT: 62 IN | RESPIRATION RATE: 16 BRPM | WEIGHT: 114.2 LBS | OXYGEN SATURATION: 99 % | HEART RATE: 71 BPM | BODY MASS INDEX: 21.02 KG/M2 | DIASTOLIC BLOOD PRESSURE: 91 MMHG

## 2017-12-15 DIAGNOSIS — G89.4 CHRONIC PAIN SYNDROME: ICD-10-CM

## 2017-12-15 DIAGNOSIS — Z76.0 MEDICATION REFILL: ICD-10-CM

## 2017-12-15 DIAGNOSIS — F90.9 ATTENTION DEFICIT HYPERACTIVITY DISORDER (ADHD), UNSPECIFIED ADHD TYPE: Primary | ICD-10-CM

## 2017-12-15 DIAGNOSIS — G40.209 PARTIAL SYMPTOMATIC EPILEPSY WITH COMPLEX PARTIAL SEIZURES, NOT INTRACTABLE, WITHOUT STATUS EPILEPTICUS (HCC): ICD-10-CM

## 2017-12-15 DIAGNOSIS — Z79.891 USE OF OPIATES FOR THERAPEUTIC PURPOSES: ICD-10-CM

## 2017-12-15 DIAGNOSIS — G44.011 INTRACTABLE EPISODIC CLUSTER HEADACHE: ICD-10-CM

## 2017-12-15 PROBLEM — E11.21 TYPE 2 DIABETES MELLITUS WITH NEPHROPATHY (HCC): Status: ACTIVE | Noted: 2017-12-15

## 2017-12-15 RX ORDER — AMITRIPTYLINE HYDROCHLORIDE 10 MG/1
10 TABLET, FILM COATED ORAL
Qty: 30 TAB | Refills: 2 | Status: SHIPPED | OUTPATIENT
Start: 2017-12-15 | End: 2018-01-23

## 2017-12-15 RX ORDER — DEXTROAMPHETAMINE SACCHARATE, AMPHETAMINE ASPARTATE, DEXTROAMPHETAMINE SULFATE AND AMPHETAMINE SULFATE 7.5; 7.5; 7.5; 7.5 MG/1; MG/1; MG/1; MG/1
30 TABLET ORAL 2 TIMES DAILY
Qty: 60 TAB | Refills: 0 | Status: SHIPPED | OUTPATIENT
Start: 2018-01-15 | End: 2018-02-19 | Stop reason: SDUPTHER

## 2017-12-15 RX ORDER — OXYCODONE AND ACETAMINOPHEN 10; 325 MG/1; MG/1
1 TABLET ORAL
Qty: 60 TAB | Refills: 0 | Status: SHIPPED | OUTPATIENT
Start: 2018-01-15

## 2017-12-15 RX ORDER — PREDNISONE 5 MG/1
5 TABLET ORAL 3 TIMES DAILY
Qty: 30 TAB | Refills: 0 | Status: SHIPPED | OUTPATIENT
Start: 2017-12-15 | End: 2018-01-23

## 2017-12-15 RX ORDER — DEXTROAMPHETAMINE SACCHARATE, AMPHETAMINE ASPARTATE, DEXTROAMPHETAMINE SULFATE AND AMPHETAMINE SULFATE 7.5; 7.5; 7.5; 7.5 MG/1; MG/1; MG/1; MG/1
30 TABLET ORAL 2 TIMES DAILY
Qty: 60 TAB | Refills: 0 | Status: SHIPPED | OUTPATIENT
Start: 2017-12-15 | End: 2017-12-15 | Stop reason: SDUPTHER

## 2017-12-15 RX ORDER — OXYCODONE AND ACETAMINOPHEN 10; 325 MG/1; MG/1
1 TABLET ORAL
Qty: 60 TAB | Refills: 0 | Status: SHIPPED | OUTPATIENT
Start: 2017-12-15 | End: 2017-12-15 | Stop reason: SDUPTHER

## 2017-12-15 NOTE — PROGRESS NOTES
Chief Complaint   Patient presents with    Seizure    Medication Refill     1. Have you been to the ER, urgent care clinic since your last visit? Hospitalized since your last visit? No    2. Have you seen or consulted any other health care providers outside of the [address] [place] [address] since your last visit? Include any pap smears or colon screening.  Dr. Rony Orellana

## 2017-12-15 NOTE — PROGRESS NOTES
Neurology Progress Note    NAME:  Yara Castanon   :   1965   MRN:   [id number]     Date/Time:  12/15/2017  Subjective:      Yara Castanon is a 45 y.o. female here today for for headache and seizure  Says Headache is nearly daily, throbbing and achy, mostly frontal, associated with nausea, photophobia, headache is aggravated by stress. She has been having sleep difficulty, difficulty going to sleep and staying asleep, she is always tired in the day. Patient will benefit from Physical therapy. Pain is sharp in nature mostly in her neck, goes down to the arms at times,  aggravated by activity,she that she has not had any seizure activity since last visit. Review of Systems - General ROS: positive for  - fatigue, malaise, sleep disturbance and weight loss  Psychological ROS: positive for - anxiety, depression and sleep disturbances  Ophthalmic ROS: positive for - blurry vision, decreased vision and photophobia  ENT ROS: positive for - headaches, vertigo and visual changes  Allergy and Immunology ROS: negative  Hematological and Lymphatic ROS: negative  Endocrine ROS: negative  Respiratory ROS: no cough, shortness of breath, or wheezing  Cardiovascular ROS: no chest pain or dyspnea on exertion  Gastrointestinal ROS: no abdominal pain, change in bowel habits, or black or bloody stools  Genito-Urinary ROS: no dysuria, trouble voiding, or hematuria  Musculoskeletal ROS: positive for - joint pain, muscle pain and muscular weakness  Neurological ROS: positive for - dizziness, headaches, numbness/tingling, visual changes and weakness  Dermatological ROS: negative  Medications reviewed:  Current Outpatient Prescriptions   Medication Sig Dispense Refill    amLODIPine (NORVASC) 10 mg tablet daily.  metoprolol tartrate (LOPRESSOR) 100 mg IR tablet daily.       polyethylene glycol (MIRALAX) 17 gram/dose powder       oxyCODONE-acetaminophen (PERCOCET 10)  mg per tablet Take 1 Tab by mouth every eight (8) hours as needed for Pain. Max Daily Amount: 3 Tabs. 60 Tab 0    diazePAM (VALIUM) 10 mg tablet Take 1 Tab by mouth nightly. Max Daily Amount: 10 mg. 30 Tab 3    dextroamphetamine-amphetamine (ADDERALL) 30 mg tablet Take 1 Tab by mouth two (2) times a dayEarliest Fill Date: 10/18/17. Max Daily Amount: 2 Tabs 60 Tab 0    divalproex DR (DEPAKOTE) 500 mg tablet Take 1 Tab by mouth two (2) times a day. 60 Tab 2    TRUE METRIX GLUCOSE TEST STRIP strip       losartan-hydroCHLOROthiazide (HYZAAR) 100-12.5 mg per tablet daily.  Cholecalciferol, Vitamin D3, 50,000 unit cap Take  by mouth every month.  Lancets misc by Does Not Apply route two (2) times a day.  Blood-Glucose Meter (TRUE METRIX AIR GLUCOSE METER) monitoring kit by Does Not Apply route two (2) times a day.  atorvastatin (LIPITOR) 10 mg tablet Take 10 mg by mouth nightly.  omeprazole (PRILOSEC) 20 mg capsule 1 po bid prn 60 Cap 5    escitalopram oxalate (LEXAPRO) 20 mg tablet Take 20 mg by mouth daily.  topiramate (TOPAMAX) 100 mg tablet Take 100 mg by mouth daily.  methylPREDNISolone (MEDROL DOSEPACK) 4 mg tablet       metFORMIN (GLUCOPHAGE) 500 mg tablet Take 500 mg by mouth two (2) times daily (with meals).           Objective:   Vitals:  Vitals:    12/15/17 0911   BP: (!) 133/91   Pulse: 71   Resp: 16   Temp: 98 °F (36.7 °C)   TempSrc: Temporal   SpO2: 99%   Weight: 114 lb 3.2 oz (51.8 kg)   Height: 5' 2\" (1.575 m)   PainSc:   6   PainLoc: Head   LMP: 11/01/2003     Lab Data Reviewed:  Lab Results   Component Value Date/Time    WBC 5.9 01/27/2017 01:45 PM    HCT 41.3 01/27/2017 01:45 PM    HGB 13.7 01/27/2017 01:45 PM    PLATELET 560 15/70/9921 01:45 PM       Lab Results   Component Value Date/Time    Sodium 143 01/27/2017 01:45 PM    Potassium 3.3 01/27/2017 01:45 PM    Chloride 105 01/27/2017 01:45 PM    CO2 26 01/27/2017 01:45 PM    Glucose 93 01/27/2017 01:45 PM    BUN 26 01/27/2017 01:45 PM    Creatinine 1.12 01/27/2017 01:45 PM    Calcium 8.9 01/27/2017 01:45 PM       No components found for: TROPQUANT    No results found for: KRIS      Lab Results   Component Value Date/Time    Hemoglobin A1c 6.4 01/27/2017 01:45 PM    Hemoglobin A1c (POC) 9.4 05/25/2016 10:42 AM        Lab Results   Component Value Date/Time    Vitamin B12 1367 11/05/2016 05:20 AM    Folate 20.3 11/05/2016 05:20 AM       No results found for: KRIS, [place], KATTYANA    Lab Results   Component Value Date/Time    Cholesterol, total 200 02/23/2016 11:00 AM    HDL Cholesterol 75 02/23/2016 11:00 AM    LDL, calculated 106 02/23/2016 11:00 AM    VLDL, calculated 19 02/23/2016 11:00 AM    Triglyceride 94 02/23/2016 11:00 AM         CT Results (recent):    Results from [place] encounter on 01/27/17   CT HEAD WO CONT   Narrative CLINICAL HISTORY: Syncope    INDICATION: Syncope    COMPARISON: 11/4/2016      CT dose reduction was achieved through use of a standardized protocol tailored  for this examination and automatic exposure control for dose modulation. TECHNIQUE: Serial axial images with a collimation of 5 mm were obtained from the  skull base through the vertex      FINDINGS: The ventricles, cisterns, and cortical sulci are normal. The  gray-white matter demarcation is normal. No abnormal mass is identified. There  is no evidence of an acute infarction, hemorrhage, or mass-effect. There is no  evidence of midline shift or hydrocephalus. Posterior fossa structures are  unremarkable. No extra-axial collections are seen. Mastoid air cells and the visualized paranasal sinuses are well pneumatized and  clear. There is no evidence of depressed skull fractures of soft tissue  swelling. Impression IMPRESSION:     Normal CT scan of the head. MRI Results (recent):    Results from [place] encounter on 07/19/16   MRI CERV SPINE WO CONT   Narrative **Final Report**      ICD Codes / Adm. Diagnosis: 723.4 782.0 / Brachial neuritis or radiculit    Disturbance of skin sensatio  Examination:  MR ESPINAL SPINE WO CON  - [id number] - Jul 19 2016  7:33AM  Accession No:  [id number]  Reason:  cervical radiculopathy, d/o sensation      REPORT:  Study: MR ESPINAL SPINE CLAY CON    Indication:  cervical radiculopathy, d/o sensation    Additional clinical history: Brachial neuritis or radiculit Disturbance of   skin sensation    Comparison:  5/2/2013    Contrast: None administered    Technique:  Magnetic resonance images of the cervical spine were obtained. The following sequences were obtained: Sagittal T1, T2, T2 STIR; axial T1,   gradient echo, and T2. Findings: There is straightening of normal cervical lordosis. There is no subluxation. Minimal to mild disc space narrowing is seen from C2-C3 through C5-C6. Minimal osteophytic bony endplate changes are seen at multiple levels. Normal signal is seen in the spinal cord and canal. No concerning signal   changes are seen in the visualized paraspinal soft tissues. C2-C3: No neuroforaminal narrowing or spinal canal stenosis. C3-C4: No neuroforaminal narrowing or spinal canal stenosis. C4-C5: Minimal disc bulge. No neuroforaminal narrowing or spinal canal   stenosis. C5-C6: Small disc osteophyte complex mildly effacing the ventral thecal sac,   unchanged. No neuroforaminal narrowing or spinal canal stenosis. C6-C7: Minimal disc bulge. No neuroforaminal narrowing or spinal canal   stenosis. C7-T1: No neuroforaminal narrowing or spinal canal stenosis. IMPRESSION:  Minimal to mild multilevel degenerative disc disease, worst at C5-C6,   unchanged. No significant neuroforaminal narrowing or spinal canal stenosis. Signing/Reading Doctor: Rich Cheung ([id number])    Approved: Rich Cheung ([id number])  Jul 19 2016  8:34AM                               IR Results (recent):  No results found for this or any previous visit. VAS/US Results (recent):   No results found for this or any previous visit. PHYSICAL EXAM:  General:    Alert, cooperative, no distress, appears stated age. Head:   Normocephalic, without obvious abnormality, atraumatic. Eyes:   Conjunctivae/corneas clear. PERRLA  Nose:  Nares normal. No drainage or sinus tenderness. Throat:    Lips, mucosa, and tongue normal.  No Thrush  Neck:  Supple, symmetrical,  no adenopathy, thyroid: non tender    no carotid bruit and no JVD. Paraspinal tenderness  Back:    Symmetric,  No CVA tenderness. Lungs:   Clear to auscultation bilaterally. No Wheezing or Rhonchi. No rales. Chest wall:  No tenderness or deformity. No Accessory muscle use. Heart:   Regular rate and rhythm,  no murmur, rub or gallop. Abdomen:   Soft, non-tender. Not distended. Bowel sounds normal. No masses  Extremities: Extremities normal, atraumatic, No cyanosis. No edema. No clubbing  Skin:     Texture, turgor normal. No rashes or lesions. Not Jaundiced  Lymph nodes: Cervical, supraclavicular normal.  Psych:  Good insight. Not depressed. Not anxious or agitated. NEUROLOGICAL EXAM:  Appearance: The patient is well developed, well nourished, provides a coherent history and is in no acute distress. Mental Status: Oriented to time, place and person. Mood and affect appropriate. Cranial Nerves:   Intact visual fields. Fundi are benign. BENIGNO, EOM's full, no nystagmus, no ptosis. Facial sensation is normal. Corneal reflexes are intact. Facial movement is symmetric. Hearing is normal bilaterally. Palate is midline with normal sternocleidomastoid and trapezius muscles are normal. Tongue is midline. Motor:  5/5 strength in upper and lower proximal and distal muscles. Normal bulk and tone. No fasciculations. Reflexes:   Deep tendon reflexes 2+/4 and symmetrical.   Sensory:   Dysesthesia to touch, pinprick and vibration. Gait:  Normal gait. Tremor:   Mild tremor noted. Cerebellar:  No cerebellar signs present. Neurovascular:  Normal heart sounds and regular rhythm, peripheral pulses intact, and no carotid bruits. Assesment  1. Medication refill    - oxyCODONE-acetaminophen (PERCOCET 10)  mg per tablet; Take 1 Tab by mouth every eight (8) hours as needed for Pain. Max Daily Amount: 3 Tabs. Dispense: 60 Tab; Refill: 0  - dextroamphetamine-amphetamine (ADDERALL) 30 mg tablet; Take 1 Tab by mouth two (2) times a dayEarliest Fill Date: 12/15/17. Max Daily Amount: 2 Tabs  Dispense: 60 Tab; Refill: 0    2. Attention deficit hyperactivity disorder (ADHD), unspecified ADHD type    - COMPLIANCE DRUG SCREEN/PRESCRIPTION MONITORING    3. Chronic pain syndrome    - COMPLIANCE DRUG SCREEN/PRESCRIPTION MONITORING  - REFERRAL TO PAIN MANAGEMENT    4. Use of opiates for therapeutic purposes    - COMPLIANCE DRUG SCREEN/PRESCRIPTION MONITORING    5. Partial symptomatic epilepsy with complex partial seizures, not intractable, without status epilepticus (HCC)  Stable    6. Intractable episodic cluster headache  Continue management    ___________________________________________________  PLAN:Medication reviewed with patient      ICD-10-CM ICD-9-CM    1. Attention deficit hyperactivity disorder (ADHD), unspecified ADHD type F90.9 314.01 COMPLIANCE DRUG SCREEN/PRESCRIPTION MONITORING   2. Medication refill Z76.0 V68.1 oxyCODONE-acetaminophen (PERCOCET 10)  mg per tablet      dextroamphetamine-amphetamine (ADDERALL) 30 mg tablet   3. Chronic pain syndrome G89.4 338.4 COMPLIANCE DRUG SCREEN/PRESCRIPTION MONITORING      REFERRAL TO PAIN MANAGEMENT   4. Use of opiates for therapeutic purposes Z79.891 V58.69 COMPLIANCE DRUG SCREEN/PRESCRIPTION MONITORING   5. Partial symptomatic epilepsy with complex partial seizures, not intractable, without status epilepticus ([facility] G40.209 345.40    6. Intractable episodic cluster headache G44.011 339.01      Follow-up Disposition:  Return in about 2 months (around 2/15/2018). ___________________________________________________    Total time spent with patient:  []15   []25   []35   [] __ minutes    Care Plan discussed with:    [x]Patient   []Family    []Care Manager   []Consultant/Specialist :    ___________________________________________________    Attending Physician: Matilda Sloan MD

## 2017-12-15 NOTE — MR AVS SNAPSHOT
Visit Information Date & Time Provider Department Dept. Phone Encounter #  
 12/15/2017  9:00 AM Duane Carrera MD [facility] at [facility] [phone] [id number] Follow-up Instructions Return in about 2 months (around 2/15/2018). Upcoming Health Maintenance Date Due  
 EYE EXAM RETINAL OR DILATED Q1 1/8/1973 Pneumococcal 19-64 Medium Risk (1 of 1 - PPSV23) 1/8/1982 FOBT Q 1 YEAR AGE 50-75 1/8/2013 DTaP/Tdap/Td series (1 - Tdap) 2/24/2016 FOOT EXAM Q1 2/18/2017 HEMOGLOBIN A1C Q6M 7/27/2017 Influenza Age 5 to Adult 8/1/2017 MICROALBUMIN Q1 9/7/2017 LIPID PANEL Q1 9/7/2017 PAP AKA CERVICAL CYTOLOGY 12/21/2018 Allergies as of 12/15/2017  Review Complete On: 12/15/2017 By: Ankur Bonilla MD  
 No Known Allergies Current Immunizations  Reviewed on 2/23/2016 Name Date Td, Adsorbed PF 2/23/2016 Not reviewed this visit You Were Diagnosed With   
  
 Codes Comments Attention deficit hyperactivity disorder (ADHD), unspecified ADHD type    -  Primary ICD-10-CM: F90.9 ICD-9-CM: 314.01 Medication refill     ICD-10-CM: Z76.0 ICD-9-CM: V68.1 Chronic pain syndrome     ICD-10-CM: G89.4 ICD-9-CM: 338.4 Use of opiates for therapeutic purposes     ICD-10-CM: Z79.891 ICD-9-CM: V58.69 Partial symptomatic epilepsy with complex partial seizures, not intractable, without status epilepticus ([facility] [address]     ICD-10-CM: Y92.028 ICD-9-CM: 345.40 Intractable episodic cluster headache     ICD-10-CM: G44.011 
ICD-9-CM: 339.01 Vitals BP Pulse Temp Resp Height(growth percentile) Weight(growth percentile) (!) 133/91 (BP 1 Location: Right arm, BP Patient Position: Sitting) 71 98 °F (36.7 °C) (Temporal) 16 5' 2\" (1.575 m) 114 lb 3.2 oz (51.8 kg) LMP SpO2 BMI OB Status Smoking Status 11/01/2003 (LMP Unknown) 99% 20.89 kg/m2 Hysterectomy Never Smoker Vitals History BMI and BSA Data Body Mass Index Body Surface Area  
 20.89 kg/m 2 1.51 m 2 Preferred Pharmacy Pharmacy Name Phone ANNA VERAS [facility] [id number] [facility], [address] [place] [address] [phone] Your Updated Medication List  
  
   
This list is accurate as of: 12/15/17  9:40 AM.  Always use your most recent med list.  
  
  
  
  
 amitriptyline 10 mg tablet Commonly known as:  ELAVIL Take 1 Tab by mouth nightly. amLODIPine 10 mg tablet Commonly known as:  NORVASC  
daily. atorvastatin 10 mg tablet Commonly known as:  LIPITOR Take 10 mg by mouth nightly. Cholecalciferol (Vitamin D3) 50,000 unit Cap Take  by mouth every month. dextroamphetamine-amphetamine 30 mg tablet Commonly known as:  ADDERALL Take 1 Tab by mouth two (2) times a dayEarliest Fill Date: 1/15/18. Max Daily Amount: 2 Tabs Start taking on:  1/15/2018  
  
 diazePAM 10 mg tablet Commonly known as:  VALIUM Take 1 Tab by mouth nightly. Max Daily Amount: 10 mg.  
  
 divalproex  mg tablet Commonly known as:  DEPAKOTE Take 1 Tab by mouth two (2) times a day. Lancets Misc  
by Does Not Apply route two (2) times a day. LEXAPRO 20 mg tablet Generic drug:  escitalopram oxalate Take 20 mg by mouth daily. losartan-hydroCHLOROthiazide 100-12.5 mg per tablet Commonly known as:  HYZAAR  
daily. metFORMIN 500 mg tablet Commonly known as:  GLUCOPHAGE Take 500 mg by mouth two (2) times daily (with meals). methylPREDNISolone 4 mg tablet Commonly known as:  MEDROL DOSEPACK  
  
 metoprolol tartrate 100 mg IR tablet Commonly known as:  LOPRESSOR  
daily. omeprazole 20 mg capsule Commonly known as:  PRILOSEC 1 po bid prn  
  
 oxyCODONE-acetaminophen  mg per tablet Commonly known as:  PERCOCET 10 Take 1 Tab by mouth every eight (8) hours as needed for Pain. Max Daily Amount: 3 Tabs. Start taking on:  1/15/2018 polyethylene glycol 17 gram/dose powder Commonly known as:  MIRALAX  
  
 predniSONE 5 mg tablet Commonly known as:  Jacquelyn Hairider Take 1 Tab by mouth three (3) times daily. TOPAMAX 100 mg tablet Generic drug:  topiramate Take 100 mg by mouth daily. TRUE METRIX AIR GLUCOSE METER monitoring kit Generic drug:  Blood-Glucose Meter  
by Does Not Apply route two (2) times a day. TRUE METRIX GLUCOSE TEST STRIP strip Generic drug:  glucose blood VI test strips Prescriptions Printed Refills  
 dextroamphetamine-amphetamine (ADDERALL) 30 mg tablet 0 Starting on: 1/15/2018 Sig: Take 1 Tab by mouth two (2) times a dayEarliest Fill Date: 1/15/18. Max Daily Amount: 2 Tabs Class: Print Route: Oral  
 oxyCODONE-acetaminophen (PERCOCET 10)  mg per tablet 0 Starting on: 1/15/2018 Sig: Take 1 Tab by mouth every eight (8) hours as needed for Pain. Max Daily Amount: 3 Tabs. Class: Print Route: Oral  
  
Prescriptions Sent to Pharmacy Refills  
 predniSONE (DELTASONE) 5 mg tablet 0 Sig: Take 1 Tab by mouth three (3) times daily. Class: Normal  
 Pharmacy: [address] Ph #: [phone] Route: Oral  
 amitriptyline (ELAVIL) 10 mg tablet 2 Sig: Take 1 Tab by mouth nightly. Class: Normal  
 Pharmacy: [address] Ph #: [phone] Route: Oral  
  
We Performed the Following [address] MONITORING [[id number] Custom] REFERRAL TO PAIN MANAGEMENT [[id number] Custom] Follow-up Instructions Return in about 2 months (around 2/15/2018). Referral Information Referral ID Referred By Referred To  
  
 [id number] Isai ESPINAL Not Available Visits Status Start Date End Date 1 New Request 12/15/17 12/15/18  If your referral has a status of pending review or denied, additional information will be sent to support the outcome of this decision. Introducing [facility]! Dear Caitlin Stafford: Thank you for requesting a [employer] account. Our records indicate that you already have an active [employer] account. You can access your account anytime at [email or web address]. [employer] Did you know that you can access your hospital and ER discharge instructions at any time in [employer]? You can also review all of your test results from your hospital stay or ER visit. Additional Information If you have questions, please visit the Frequently Asked Questions section of the [employer] website at [email or web address] [employer] Remember, [employer] is NOT to be used for urgent needs. For medical emergencies, dial 911. Now available from your iPhone and Android! Please provide this summary of care documentation to your next provider. Your primary care clinician is listed as Merlene Wright. If you have any questions after today's visit, please call [phone].

## 2017-12-22 LAB — DRUGS UR: NORMAL

## 2018-01-04 PROBLEM — F33.9 RECURRENT DEPRESSION (HCC): Status: ACTIVE | Noted: 2018-01-04

## 2018-01-23 ENCOUNTER — HOSPITAL ENCOUNTER (OUTPATIENT)
Dept: LAB | Age: 53
Discharge: HOME | End: 2018-01-23
Payer: MEDICARE

## 2018-01-23 ENCOUNTER — OFFICE VISIT (OUTPATIENT)
Dept: OBGYN CLINIC | Age: 53
End: 2018-01-23

## 2018-01-23 VITALS
HEART RATE: 81 BPM | DIASTOLIC BLOOD PRESSURE: 82 MMHG | SYSTOLIC BLOOD PRESSURE: 124 MMHG | BODY MASS INDEX: 21.94 KG/M2 | TEMPERATURE: 97 F | RESPIRATION RATE: 18 BRPM | HEIGHT: 62 IN | WEIGHT: 119.2 LBS

## 2018-01-23 DIAGNOSIS — Z01.419 WELL WOMAN EXAM WITH ROUTINE GYNECOLOGICAL EXAM: ICD-10-CM

## 2018-01-23 DIAGNOSIS — N76.0 ACUTE VAGINITIS: Primary | ICD-10-CM

## 2018-01-23 PROCEDURE — 88175 CYTOPATH C/V AUTO FLUID REDO: CPT | Performed by: OBSTETRICS & GYNECOLOGY

## 2018-01-23 RX ORDER — ESTRADIOL 0.1 MG/G
CREAM VAGINAL
Qty: 1 TUBE | Refills: 6 | Status: SHIPPED | OUTPATIENT
Start: 2018-01-23
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
## 2018-01-24 DIAGNOSIS — G40.909 SEIZURE DISORDER (HCC): Primary | ICD-10-CM

## 2018-01-24 RX ORDER — DIVALPROEX SODIUM 500 MG/1
500 TABLET, DELAYED RELEASE ORAL 2 TIMES DAILY
Qty: 60 TAB | Refills: 3 | Status: SHIPPED | OUTPATIENT
Start: 2018-01-24 | End: 2018-06-20 | Stop reason: SDUPTHER

## 2018-01-24 NOTE — TELEPHONE ENCOUNTER
Patient states she is completely out of her medication. Says pharmacy called to get a refill and told her that it was denied. I told her I would leave a message regarding.

## 2018-01-26 LAB
A VAGINAE DNA VAG QL NAA+PROBE: NORMAL SCORE
BVAB2 DNA VAG QL NAA+PROBE: NORMAL SCORE
C ALBICANS DNA VAG QL NAA+PROBE: NEGATIVE
C GLABRATA DNA VAG QL NAA+PROBE: NEGATIVE
MEGA1 DNA VAG QL NAA+PROBE: NORMAL SCORE
T VAGINALIS RRNA SPEC QL NAA+PROBE: NEGATIVE

## 2018-02-19 ENCOUNTER — OFFICE VISIT (OUTPATIENT)
Dept: NEUROLOGY | Age: 53
End: 2018-02-19

## 2018-02-19 VITALS
OXYGEN SATURATION: 97 % | RESPIRATION RATE: 16 BRPM | WEIGHT: 115.2 LBS | TEMPERATURE: 96.5 F | HEART RATE: 93 BPM | SYSTOLIC BLOOD PRESSURE: 112 MMHG | DIASTOLIC BLOOD PRESSURE: 80 MMHG | BODY MASS INDEX: 21.2 KG/M2 | HEIGHT: 62 IN

## 2018-02-19 DIAGNOSIS — F98.8 ATTENTION DEFICIT DISORDER (ADD) WITHOUT HYPERACTIVITY: ICD-10-CM

## 2018-02-19 DIAGNOSIS — Z79.891 USE OF OPIATES FOR THERAPEUTIC PURPOSES: ICD-10-CM

## 2018-02-19 DIAGNOSIS — Z76.0 MEDICATION REFILL: ICD-10-CM

## 2018-02-19 DIAGNOSIS — G40.909 SEIZURE DISORDER (HCC): Primary | ICD-10-CM

## 2018-02-19 DIAGNOSIS — G89.4 CHRONIC PAIN SYNDROME: ICD-10-CM

## 2018-02-19 DIAGNOSIS — G43.019 INTRACTABLE MIGRAINE WITHOUT AURA AND WITHOUT STATUS MIGRAINOSUS: ICD-10-CM

## 2018-02-19 RX ORDER — DEXTROAMPHETAMINE SACCHARATE, AMPHETAMINE ASPARTATE, DEXTROAMPHETAMINE SULFATE AND AMPHETAMINE SULFATE 7.5; 7.5; 7.5; 7.5 MG/1; MG/1; MG/1; MG/1
30 TABLET ORAL 2 TIMES DAILY
Qty: 60 TAB | Refills: 0 | Status: SHIPPED | OUTPATIENT
Start: 2018-03-19 | End: 2018-04-20 | Stop reason: SDUPTHER

## 2018-02-19 RX ORDER — AMITRIPTYLINE HYDROCHLORIDE 10 MG/1
TABLET, FILM COATED ORAL
COMMUNITY
End: 2018-02-19 | Stop reason: DRUGHIGH

## 2018-02-19 RX ORDER — AMITRIPTYLINE HYDROCHLORIDE 25 MG/1
25 TABLET, FILM COATED ORAL
Qty: 30 TAB | Refills: 3 | Status: SHIPPED | OUTPATIENT
Start: 2018-02-19 | End: 2018-04-20 | Stop reason: DRUGHIGH

## 2018-02-19 RX ORDER — DEXTROAMPHETAMINE SACCHARATE, AMPHETAMINE ASPARTATE, DEXTROAMPHETAMINE SULFATE AND AMPHETAMINE SULFATE 7.5; 7.5; 7.5; 7.5 MG/1; MG/1; MG/1; MG/1
30 TABLET ORAL 2 TIMES DAILY
Qty: 60 TAB | Refills: 0 | Status: SHIPPED | OUTPATIENT
Start: 2018-02-19 | End: 2018-02-19 | Stop reason: SDUPTHER

## 2018-02-19 NOTE — PROGRESS NOTES
Neurology Progress Note    NAME:  Nemesio Somers   :   1965   MRN:   [id number]     Date/Time:  2018  Subjective:      Nemesio Somers is a 52 y.o. female here today for follow up for headache, seizure , pain  Headache waxes and wanes, frequency is variable, throbbing in nature , mostly frontal , occasional sharp pain when coming from the back of the head. There is associated ocassional nausea , blurry vision, dizziness, no vomiting. Medication helps a lot according to patient. No seizure reported since last visit, occasional numbness and tingling sensation. Patient reports periodic pain, pain is usually sharp mostly in the back , the occasional spasm, however, medication helps a whole lot. Review of Systems - General ROS: positive for  - fatigue, sleep disturbance and weight loss  Psychological ROS: positive for - anxiety and sleep disturbances  Ophthalmic ROS: positive for - blurry vision, photophobia and uses glasses  ENT ROS: positive for - headaches, tinnitus and visual changes  Allergy and Immunology ROS: negative  Hematological and Lymphatic ROS: negative  Endocrine ROS: positive for - hot flashes and skin changes  Respiratory ROS: no cough, shortness of breath, or wheezing  Cardiovascular ROS: no chest pain or dyspnea on exertion  Gastrointestinal ROS: no abdominal pain, change in bowel habits, or black or bloody stools  Genito-Urinary ROS: no dysuria, trouble voiding, or hematuria  Musculoskeletal ROS: positive for - joint pain, joint stiffness, muscle pain and muscular weakness  Neurological ROS: positive for - dizziness, headaches, impaired coordination/balance, numbness/tingling and visual changes  Dermatological ROS: negative          Medications reviewed:  Current Outpatient Prescriptions   Medication Sig Dispense Refill    amitriptyline (ELAVIL) 10 mg tablet Take  by mouth nightly.  divalproex DR (DEPAKOTE) 500 mg tablet Take 1 Tab by mouth two (2) times a day. Indications: Epilepsy 60 Tab 3    estradiol (ESTRACE) 0.01 % (0.1 mg/gram) vaginal cream Use 1/4 applicatorful in vagina nightly for 3 weeks and then twice a week 1 Tube 6    dextroamphetamine-amphetamine (ADDERALL) 30 mg tablet Take 1 Tab by mouth two (2) times a dayEarliest Fill Date: 1/15/18. Max Daily Amount: 2 Tabs 60 Tab 0    oxyCODONE-acetaminophen (PERCOCET 10)  mg per tablet Take 1 Tab by mouth every eight (8) hours as needed for Pain. Max Daily Amount: 3 Tabs. 60 Tab 0    amLODIPine (NORVASC) 10 mg tablet daily.  metoprolol tartrate (LOPRESSOR) 100 mg IR tablet 50 mg two (2) times a day. Patient stated she is taking once a day      polyethylene glycol (MIRALAX) 17 gram/dose powder as needed.  diazePAM (VALIUM) 10 mg tablet Take 1 Tab by mouth nightly. Max Daily Amount: 10 mg. 30 Tab 3    TRUE METRIX GLUCOSE TEST STRIP strip       losartan-hydroCHLOROthiazide (HYZAAR) 100-12.5 mg per tablet daily.  Cholecalciferol, Vitamin D3, 50,000 unit cap Take  by mouth every month.  Lancets misc by Does Not Apply route two (2) times a day.  Blood-Glucose Meter (TRUE METRIX AIR GLUCOSE METER) monitoring kit by Does Not Apply route two (2) times a day.  atorvastatin (LIPITOR) 10 mg tablet Take 10 mg by mouth nightly.  topiramate (TOPAMAX) 100 mg tablet Take 100 mg by mouth daily.           Objective:   Vitals:  Vitals:    02/19/18 0859   BP: 112/80   Pulse: 93   Resp: 16   Temp: 96.5 °F (35.8 °C)   TempSrc: Oral   SpO2: 97%   Weight: 115 lb 3.2 oz (52.3 kg)   Height: 5' 2\" (1.575 m)   PainSc:   0 - No pain   LMP: 11/01/2003       Lab Data Reviewed:  Lab Results   Component Value Date/Time    WBC 5.9 01/27/2017 01:45 PM    HCT 41.3 01/27/2017 01:45 PM    HGB 13.7 01/27/2017 01:45 PM    PLATELET 007 50/68/9846 01:45 PM       Lab Results   Component Value Date/Time    Sodium 143 01/27/2017 01:45 PM    Potassium 3.3 (L) 01/27/2017 01:45 PM    Chloride 105 01/27/2017 01:45 PM CO2 26 01/27/2017 01:45 PM    Glucose 93 01/27/2017 01:45 PM    BUN 26 (H) 01/27/2017 01:45 PM    Creatinine 1.12 (H) 01/27/2017 01:45 PM    Calcium 8.9 01/27/2017 01:45 PM       No components found for: TROPQUANT    No results found for: KRIS      Lab Results   Component Value Date/Time    Hemoglobin A1c 6.4 (H) 01/27/2017 01:45 PM    Hemoglobin A1c (POC) 9.4 05/25/2016 10:42 AM        Lab Results   Component Value Date/Time    Vitamin B12 1367 (H) 11/05/2016 05:20 AM    Folate 20.3 11/05/2016 05:20 AM       No results found for: KRIS, Parrish Mendoza, KE    Lab Results   Component Value Date/Time    Cholesterol, total 200 (H) 02/23/2016 11:00 AM    HDL Cholesterol 75 02/23/2016 11:00 AM    LDL, calculated 106 (H) 02/23/2016 11:00 AM    VLDL, calculated 19 02/23/2016 11:00 AM    Triglyceride 94 02/23/2016 11:00 AM         CT Results (recent):    Results from [place] encounter on 01/27/17   CT HEAD WO CONT   Narrative CLINICAL HISTORY: Syncope    INDICATION: Syncope    COMPARISON: 11/4/2016      CT dose reduction was achieved through use of a standardized protocol tailored  for this examination and automatic exposure control for dose modulation. TECHNIQUE: Serial axial images with a collimation of 5 mm were obtained from the  skull base through the vertex      FINDINGS: The ventricles, cisterns, and cortical sulci are normal. The  gray-white matter demarcation is normal. No abnormal mass is identified. There  is no evidence of an acute infarction, hemorrhage, or mass-effect. There is no  evidence of midline shift or hydrocephalus. Posterior fossa structures are  unremarkable. No extra-axial collections are seen. Mastoid air cells and the visualized paranasal sinuses are well pneumatized and  clear. There is no evidence of depressed skull fractures of soft tissue  swelling. Impression IMPRESSION:     Normal CT scan of the head.                   MRI Results (recent):    Results from Hospital Encounter encounter on 07/19/16   MRI CERV SPINE WO CONT   Narrative **Final Report**      ICD Codes / Adm. Diagnosis: 723.4  782.0 / Brachial neuritis or radiculit    Disturbance of skin sensatio  Examination:  MR ESPINAL SPINE WO CON  - [id number] - Jul 19 2016  7:33AM  Accession No:  [id number]  Reason:  cervical radiculopathy, d/o sensation      REPORT:  Study: MR ESPINAL SPINE WO CON    Indication:  cervical radiculopathy, d/o sensation    Additional clinical history: Brachial neuritis or radiculit Disturbance of   skin sensation    Comparison:  5/2/2013    Contrast: None administered    Technique:  Magnetic resonance images of the cervical spine were obtained. The following sequences were obtained: Sagittal T1, T2, T2 STIR; axial T1,   gradient echo, and T2. Findings: There is straightening of normal cervical lordosis. There is no subluxation. Minimal to mild disc space narrowing is seen from C2-C3 through C5-C6. Minimal osteophytic bony endplate changes are seen at multiple levels. Normal signal is seen in the spinal cord and canal. No concerning signal   changes are seen in the visualized paraspinal soft tissues. C2-C3: No neuroforaminal narrowing or spinal canal stenosis. C3-C4: No neuroforaminal narrowing or spinal canal stenosis. C4-C5: Minimal disc bulge. No neuroforaminal narrowing or spinal canal   stenosis. C5-C6: Small disc osteophyte complex mildly effacing the ventral thecal sac,   unchanged. No neuroforaminal narrowing or spinal canal stenosis. C6-C7: Minimal disc bulge. No neuroforaminal narrowing or spinal canal   stenosis. C7-T1: No neuroforaminal narrowing or spinal canal stenosis. IMPRESSION:  Minimal to mild multilevel degenerative disc disease, worst at C5-C6,   unchanged. No significant neuroforaminal narrowing or spinal canal stenosis.             Signing/Reading Doctor: Rickey Bhatia ([id number])    Approved: Rickey Bhatia ([id number])  Jul 19 2016 8:34AM                               IR Results (recent):  No results found for this or any previous visit. VAS/US Results (recent):  No results found for this or any previous visit. PHYSICAL EXAM:  General:    Alert, cooperative, no distress, appears stated age. Head:   Normocephalic, without obvious abnormality, atraumatic. Eyes:   Conjunctivae/corneas clear. PERRLA  Nose:  Nares normal. No drainage or sinus tenderness. Throat:    Lips, mucosa, and tongue normal.  No Thrush  Neck:  Supple, symmetrical,  no adenopathy, thyroid: non tender    no carotid bruit and no JVD. Paraspinal tenderness  Back:    Symmetric,  Bilateral tenderness. Lungs:   Clear to auscultation bilaterally. No Wheezing or Rhonchi. No rales. Chest wall:  No tenderness or deformity. No Accessory muscle use. Heart:   Regular rate and rhythm,  no murmur, rub or gallop. Abdomen:   Soft, non-tender. Not distended. Bowel sounds normal. No masses  Extremities: Extremities normal, atraumatic, No cyanosis. No edema. No clubbing  Skin:     Texture, turgor normal. No rashes or lesions. Not Jaundiced  Lymph nodes: Cervical, supraclavicular normal.  Psych:  Good insight. Not depressed. Not anxious or agitated. NEUROLOGICAL EXAM:  Appearance: The patient is well developed, well nourished, provides a coherent history and is in no acute distress. Mental Status: Oriented to time, place and person. Mood and affect appropriate. Cranial Nerves:   Intact visual fields. Fundi are benign. BENIGNO, EOM's full, no nystagmus, no ptosis. Facial sensation is normal. Corneal reflexes are intact. Facial movement is symmetric. Hearing is normal bilaterally. Palate is midline with normal sternocleidomastoid and trapezius muscles are normal. Tongue is midline. Motor:  5-/5 strength in upper and lower proximal and distal muscles. Normal bulk and tone. No fasciculations.    Reflexes:   Deep tendon reflexes 2+/4 and symmetrical.   Sensory: Dysesthesia to touch, pinprick and vibration. Gait:  Normal gait. Tremor:   No tremor noted. Cerebellar:  No cerebellar signs present. Neurovascular:  Normal heart sounds and regular rhythm, peripheral pulses intact, and no carotid bruits. Assesment  1. Medication refill    - dextroamphetamine-amphetamine (ADDERALL) 30 mg tablet; Take 1 Tab by mouth two (2) times a day. Max Daily Amount: 2 Tabs  Dispense: 60 Tab; Refill: 0    2. Seizure disorder (HCC)   Stable  - COMPLIANCE DRUG SCREEN/PRESCRIPTION MONITORING    3. Use of opiates for therapeutic purposes    - COMPLIANCE DRUG SCREEN/PRESCRIPTION MONITORING    4. Chronic pain syndrome   Continue management    5. Intractable migraine without aura and without status migrainosus     Continue management  ___________________________________________________  PLAN:Medication reviewed with patient      ICD-10-CM ICD-9-CM    1. Seizure disorder ([facility] [address] G40.909 345.90 COMPLIANCE DRUG SCREEN/PRESCRIPTION MONITORING   2. Medication refill Z76.0 V68.1 dextroamphetamine-amphetamine (ADDERALL) 30 mg tablet   3. Use of opiates for therapeutic purposes Z79.891 V58.69 COMPLIANCE DRUG SCREEN/PRESCRIPTION MONITORING   4. Chronic pain syndrome G89.4 338.4    5.  Intractable migraine without aura and without status migrainosus G43.019 346.11      Follow-up Disposition:  Return in about 2 months (around 4/19/2018), or if symptoms worsen or fail to improve.           ___________________________________________________    Total time spent with patient:  []15   []25   []35   [] __ minutes    Care Plan discussed with:    []Patient   []Family    []Care Manager   []Consultant/Specialist :    ___________________________________________________    Attending Physician: Aiyana Biggs MD

## 2018-02-19 NOTE — MR AVS SNAPSHOT
[address] [place] [address] 
[phone] Patient: Lalito Alonso MRN: [id number] :1965 Visit Information Date & Time Provider Department Dept. Phone Encounter #  
 2018  8:40 AM Cletus Griselda Cornea, MD [facility] [phone] [id number] Follow-up Instructions Return in about 2 months (around 2018), or if symptoms worsen or fail to improve. Upcoming Health Maintenance Date Due  
 EYE EXAM RETINAL OR DILATED Q1 1973 Pneumococcal 19-64 Medium Risk (1 of 1 - PPSV23) 1982 FOBT Q 1 YEAR AGE 50-75 2013 DTaP/Tdap/Td series (1 - Tdap) 2016 FOOT EXAM Q1 2017 HEMOGLOBIN A1C Q6M 2017 Influenza Age 5 to Adult 2017 MICROALBUMIN Q1 2017 LIPID PANEL Q1 2017 BREAST CANCER SCRN MAMMOGRAM 3/23/2019 PAP AKA CERVICAL CYTOLOGY 2021 Allergies as of 2018  Review Complete On: 2018 By: Duane Mendoza MD  
 No Known Allergies Current Immunizations  Reviewed on 2016 Name Date Td, Adsorbed PF 2016 Not reviewed this visit You Were Diagnosed With   
  
 Codes Comments [facility]    -  Primary ICD-10-CM: J77.450 ICD-9-CM: 345.90 Medication refill     ICD-10-CM: Z76.0 ICD-9-CM: V68.1 Use of opiates for therapeutic purposes     ICD-10-CM: Z79.891 ICD-9-CM: V58.69 Chronic pain syndrome     ICD-10-CM: G89.4 ICD-9-CM: 338.4 Intractable migraine without aura and without status migrainosus     ICD-10-CM: G43.019 
ICD-9-CM: 346.11 Attention deficit disorder (ADD) without hyperactivity     ICD-10-CM: F98.8 ICD-9-CM: 314.00 Vitals BP Pulse Temp Resp Height(growth percentile) Weight(growth percentile)  112/80 (BP 1 Location: Right arm, BP Patient Position: Sitting) 93 96.5 °F (35.8 °C) (Oral) 16 5' 2\" (1.575 m) 115 lb 3.2 oz (52.3 kg) LMP SpO2 BMI OB Status Smoking Status 11/01/2003 (LMP Unknown) 97% 21.07 kg/m2 Hysterectomy Never Smoker Vitals History BMI and BSA Data Body Mass Index Body Surface Area 21.07 kg/m 2 1.51 m 2 Preferred Pharmacy Pharmacy Name Phone [facility] [address] , [address] [facility] [phone] Your Updated Medication List  
  
   
This list is accurate as of: 2/19/18  9:33 AM.  Always use your most recent med list.  
  
  
  
  
 amitriptyline 25 mg tablet Commonly known as:  ELAVIL Take 1 Tab by mouth nightly. amLODIPine 10 mg tablet Commonly known as:  NORVASC  
daily. atorvastatin 10 mg tablet Commonly known as:  LIPITOR Take 10 mg by mouth nightly. Cholecalciferol (Vitamin D3) 50,000 unit Cap Take  by mouth every month. dextroamphetamine-amphetamine 30 mg tablet Commonly known as:  ADDERALL Take 1 Tab by mouth two (2) times a dayEarliest Fill Date: 3/19/18. Max Daily Amount: 2 Tabs Start taking on:  3/19/2018  
  
 diazePAM 10 mg tablet Commonly known as:  VALIUM Take 1 Tab by mouth nightly. Max Daily Amount: 10 mg.  
  
 divalproex  mg tablet Commonly known as:  DEPAKOTE Take 1 Tab by mouth two (2) times a day. Indications: Epilepsy  
  
 estradiol 0.01 % (0.1 mg/gram) vaginal cream  
Commonly known as:  ESTRACE Use 1/4 applicatorful in vagina nightly for 3 weeks and then twice a week Lancets Misc  
by Does Not Apply route two (2) times a day. losartan-hydroCHLOROthiazide 100-12.5 mg per tablet Commonly known as:  HYZAAR  
daily. metoprolol tartrate 100 mg IR tablet Commonly known as:  LOPRESSOR 50 mg two (2) times a day. Patient stated she is taking once a day  
  
 oxyCODONE-acetaminophen  mg per tablet Commonly known as:  PERCOCET 10  
 Take 1 Tab by mouth every eight (8) hours as needed for Pain. Max Daily Amount: 3 Tabs. polyethylene glycol 17 gram/dose powder Commonly known as:  Benson Sheeba  
as needed. TOPAMAX 100 mg tablet Generic drug:  topiramate Take 100 mg by mouth daily. TRUE METRIX AIR GLUCOSE METER monitoring kit Generic drug:  Blood-Glucose Meter  
by Does Not Apply route two (2) times a day. TRUE METRIX GLUCOSE TEST STRIP strip Generic drug:  glucose blood VI test strips Prescriptions Printed Refills  
 dextroamphetamine-amphetamine (ADDERALL) 30 mg tablet 0 Starting on: 3/19/2018 Sig: Take 1 Tab by mouth two (2) times a dayEarliest Fill Date: 3/19/18. Max Daily Amount: 2 Tabs Class: Print Route: Oral  
  
Prescriptions Sent to Pharmacy Refills  
 amitriptyline (ELAVIL) 25 mg tablet 3 Sig: Take 1 Tab by mouth nightly. Class: Normal  
 Pharmacy: [address] #: [phone] Route: Oral  
  
We Performed the Following [address] MONITORING [[id number] Custom] Follow-up Instructions Return in about 2 months (around 4/19/2018), or if symptoms worsen or fail to improve. Introducing \[facility]\"" & HEALTH SERVICES! Dear Antonia Samayoa: Thank you for requesting a [employer] account. Our records indicate that you already have an active Inkling account. You can access your account anytime at [email or web address] Did you know that you can access your hospital and ER discharge instructions at any time in Inkling? You can also review all of your test results from your hospital stay or ER visit. Additional Information If you have questions, please visit the Frequently Asked Questions section of the Inkling website at [email or web address] Remember, Inkling is NOT to be used for urgent needs. For medical emergencies, dial 911. Now available from your iPhone and Android! Please provide this summary of care documentation to your next provider. Your primary care clinician is listed as Kalia Tolentino. If you have any questions after today's visit, please call [phone].

## 2018-02-25 LAB — DRUGS UR: NORMAL

## 2018-04-20 ENCOUNTER — OFFICE VISIT (OUTPATIENT)
Dept: NEUROLOGY | Age: 53
End: 2018-04-20

## 2018-04-20 VITALS
DIASTOLIC BLOOD PRESSURE: 78 MMHG | SYSTOLIC BLOOD PRESSURE: 122 MMHG | HEART RATE: 94 BPM | HEIGHT: 62 IN | BODY MASS INDEX: 21.64 KG/M2 | OXYGEN SATURATION: 94 % | WEIGHT: 117.6 LBS

## 2018-04-20 DIAGNOSIS — G43.009 MIGRAINE WITHOUT AURA AND WITHOUT STATUS MIGRAINOSUS, NOT INTRACTABLE: ICD-10-CM

## 2018-04-20 DIAGNOSIS — G40.909 SEIZURE DISORDER (HCC): Primary | ICD-10-CM

## 2018-04-20 DIAGNOSIS — Z76.0 MEDICATION REFILL: ICD-10-CM

## 2018-04-20 DIAGNOSIS — G40.209 PARTIAL SYMPTOMATIC EPILEPSY WITH COMPLEX PARTIAL SEIZURES, NOT INTRACTABLE, WITHOUT STATUS EPILEPTICUS (HCC): ICD-10-CM

## 2018-04-20 DIAGNOSIS — F41.1 GAD (GENERALIZED ANXIETY DISORDER): ICD-10-CM

## 2018-04-20 DIAGNOSIS — R20.2 PARESTHESIA: ICD-10-CM

## 2018-04-20 DIAGNOSIS — F98.8 ATTENTION DEFICIT DISORDER (ADD) WITHOUT HYPERACTIVITY: ICD-10-CM

## 2018-04-20 DIAGNOSIS — G89.4 CHRONIC PAIN SYNDROME: ICD-10-CM

## 2018-04-20 RX ORDER — AMITRIPTYLINE HYDROCHLORIDE 10 MG/1
10 TABLET, FILM COATED ORAL
Qty: 30 TAB | Refills: 2 | Status: SHIPPED | OUTPATIENT
Start: 2018-04-20 | End: 2018-06-20 | Stop reason: SDUPTHER

## 2018-04-20 RX ORDER — SUMATRIPTAN 100 MG/1
TABLET, FILM COATED ORAL
Qty: 12 TAB | Refills: 3 | Status: SHIPPED | OUTPATIENT
Start: 2018-04-20 | End: 2018-06-20 | Stop reason: CLARIF

## 2018-04-20 RX ORDER — DEXTROAMPHETAMINE SACCHARATE, AMPHETAMINE ASPARTATE, DEXTROAMPHETAMINE SULFATE AND AMPHETAMINE SULFATE 7.5; 7.5; 7.5; 7.5 MG/1; MG/1; MG/1; MG/1
30 TABLET ORAL 2 TIMES DAILY
Qty: 60 TAB | Refills: 0 | Status: SHIPPED | OUTPATIENT
Start: 2018-04-20 | End: 2018-04-20 | Stop reason: SDUPTHER

## 2018-04-20 RX ORDER — DEXTROAMPHETAMINE SACCHARATE, AMPHETAMINE ASPARTATE, DEXTROAMPHETAMINE SULFATE AND AMPHETAMINE SULFATE 7.5; 7.5; 7.5; 7.5 MG/1; MG/1; MG/1; MG/1
30 TABLET ORAL 2 TIMES DAILY
Qty: 60 TAB | Refills: 0 | Status: SHIPPED | OUTPATIENT
Start: 2018-05-20 | End: 2018-06-20 | Stop reason: SDUPTHER

## 2018-04-20 NOTE — PROGRESS NOTES
Neurology Progress Note    NAME:  Deshaun Mchugh   :   1965   MRN:   [id number]     Date/Time:  2018  Subjective:      Deshaun Client is a 52 y.o. female here today for follow up for seizure and headache  Headache x3/week, behind the left eye back of the head, with headache , patient says she experiences confusion. Says she has been forgetting more, however, she attributes it to stress of trying to pack and move , also having grand child with her. She has not had any obvious seizure. Says she experiences occasional neck pain, pain is sharp in nature goes down the hands and she experiences numbness and tingling sensation  After discussing with patient , I will decrease Elavil back to 10mg p.o. Qhs, start patient on Imitrex prn for severe headache. Patient was asked to keep record of her headache. Free Depakote level done on 18 is 6.2.   Review of Systems - General ROS: positive for  - fatigue, hot flashes, night sweats and sleep disturbance  Psychological ROS: positive for - anxiety, depression and sleep disturbances  Ophthalmic ROS: positive for - blurry vision, decreased vision and photophobia  ENT ROS: positive for - headaches, tinnitus, vertigo and visual changes  Allergy and Immunology ROS: negative  Hematological and Lymphatic ROS: negative  Endocrine ROS: negative  Respiratory ROS: no cough, shortness of breath, or wheezing  Cardiovascular ROS: no chest pain or dyspnea on exertion  Gastrointestinal ROS: no abdominal pain, change in bowel habits, or black or bloody stools  Genito-Urinary ROS: no dysuria, trouble voiding, or hematuria  Musculoskeletal ROS: positive for - joint pain, joint stiffness and muscle pain  Neurological ROS: positive for - dizziness, headaches, numbness/tingling, seizures and visual changes  Dermatological ROS: negative      Medications reviewed:  Current Outpatient Prescriptions   Medication Sig Dispense Refill    SUMAtriptan (IMITREX) 100 mg tablet 1 tab at onset of moderate-severe migraine; may repeat 1 tab in 2 hours; Limit: 2 tabs in 24/ hrs, not more than 3 days a week 12 Tab 3    amitriptyline (ELAVIL) 10 mg tablet Take 1 Tab by mouth nightly. 30 Tab 2    [START ON 5/20/2018] dextroamphetamine-amphetamine (ADDERALL) 30 mg tablet Take 1 Tab by mouth two (2) times a dayEarliest Fill Date: 5/20/18. Max Daily Amount: 2 Tabs 60 Tab 0    divalproex DR (DEPAKOTE) 500 mg tablet Take 1 Tab by mouth two (2) times a day. Indications: Epilepsy 60 Tab 3    estradiol (ESTRACE) 0.01 % (0.1 mg/gram) vaginal cream Use 1/4 applicatorful in vagina nightly for 3 weeks and then twice a week 1 Tube 6    oxyCODONE-acetaminophen (PERCOCET 10)  mg per tablet Take 1 Tab by mouth every eight (8) hours as needed for Pain. Max Daily Amount: 3 Tabs. 60 Tab 0    amLODIPine (NORVASC) 10 mg tablet daily.  metoprolol tartrate (LOPRESSOR) 100 mg IR tablet 50 mg two (2) times a day. Patient stated she is taking once a day      polyethylene glycol (MIRALAX) 17 gram/dose powder as needed.  diazePAM (VALIUM) 10 mg tablet Take 1 Tab by mouth nightly. Max Daily Amount: 10 mg. 30 Tab 3    TRUE METRIX GLUCOSE TEST STRIP strip       losartan-hydroCHLOROthiazide (HYZAAR) 100-12.5 mg per tablet daily.  Cholecalciferol, Vitamin D3, 50,000 unit cap Take  by mouth every month.  Lancets misc by Does Not Apply route two (2) times a day.  Blood-Glucose Meter (TRUE METRIX AIR GLUCOSE METER) monitoring kit by Does Not Apply route two (2) times a day.  atorvastatin (LIPITOR) 10 mg tablet Take 10 mg by mouth nightly.  topiramate (TOPAMAX) 100 mg tablet Take 100 mg by mouth daily.           Objective:   Vitals:  Vitals:    04/20/18 1117   BP: 122/78   Pulse: 94   SpO2: 94%   Weight: 117 lb 9.6 oz (53.3 kg)   Height: 5' 2\" (1.575 m)   PainSc:   6   PainLoc: Head   LMP: 11/01/2003               Lab Data Reviewed:  Lab Results   Component Value Date/Time    WBC 5.9 01/27/2017 01:45 PM    HCT 41.3 01/27/2017 01:45 PM    HGB 13.7 01/27/2017 01:45 PM    PLATELET 638 90/16/0307 01:45 PM       Lab Results   Component Value Date/Time    Sodium 143 01/27/2017 01:45 PM    Potassium 3.3 (L) 01/27/2017 01:45 PM    Chloride 105 01/27/2017 01:45 PM    CO2 26 01/27/2017 01:45 PM    Glucose 93 01/27/2017 01:45 PM    BUN 26 (H) 01/27/2017 01:45 PM    Creatinine 1.12 (H) 01/27/2017 01:45 PM    Calcium 8.9 01/27/2017 01:45 PM       No components found for: TROPQUANT    No results found for: KRIS      Lab Results   Component Value Date/Time    Hemoglobin A1c 6.4 (H) 01/27/2017 01:45 PM    Hemoglobin A1c (POC) 9.4 05/25/2016 10:42 AM        Lab Results   Component Value Date/Time    Vitamin B12 1367 (H) 11/05/2016 05:20 AM    Folate 20.3 11/05/2016 05:20 AM       No results found for: KRIS, Hilary Bence, XBANA    Lab Results   Component Value Date/Time    Cholesterol, total 200 (H) 02/23/2016 11:00 AM    HDL Cholesterol 75 02/23/2016 11:00 AM    LDL, calculated 106 (H) 02/23/2016 11:00 AM    VLDL, calculated 19 02/23/2016 11:00 AM    Triglyceride 94 02/23/2016 11:00 AM         CT Results (recent):    Results from Hospital Encounter encounter on 01/27/17   CT HEAD WO CONT   Narrative CLINICAL HISTORY: Syncope    INDICATION: Syncope    COMPARISON: 11/4/2016      CT dose reduction was achieved through use of a standardized protocol tailored  for this examination and automatic exposure control for dose modulation. TECHNIQUE: Serial axial images with a collimation of 5 mm were obtained from the  skull base through the vertex      FINDINGS: The ventricles, cisterns, and cortical sulci are normal. The  gray-white matter demarcation is normal. No abnormal mass is identified. There  is no evidence of an acute infarction, hemorrhage, or mass-effect. There is no  evidence of midline shift or hydrocephalus. Posterior fossa structures are  unremarkable. No extra-axial collections are seen.     Mastoid air cells and the visualized paranasal sinuses are well pneumatized and  clear. There is no evidence of depressed skull fractures of soft tissue  swelling. Impression IMPRESSION:     Normal CT scan of the head. MRI Results (recent):    Results from [place] encounter on 07/19/16   MRI CERV SPINE WO CONT   Narrative **Final Report**      ICD Codes / Adm. Diagnosis: 723.4  782.0 / Brachial neuritis or radiculit    Disturbance of skin sensatio  Examination:  MR C SPINE WO CON  - [id number] - Jul 19 2016  7:33AM  Accession No:  [id number]  Reason:  cervical radiculopathy, d/o sensation      REPORT:  Study: MR C SPINE WO CON    Indication:  cervical radiculopathy, d/o sensation    Additional clinical history: Brachial neuritis or radiculit Disturbance of   skin sensation    Comparison:  5/2/2013    Contrast: None administered    Technique:  Magnetic resonance images of the cervical spine were obtained. The following sequences were obtained: Sagittal T1, T2, T2 STIR; axial T1,   gradient echo, and T2. Findings: There is straightening of normal cervical lordosis. There is no subluxation. Minimal to mild disc space narrowing is seen from C2-C3 through C5-C6. Minimal osteophytic bony endplate changes are seen at multiple levels. Normal signal is seen in the spinal cord and canal. No concerning signal   changes are seen in the visualized paraspinal soft tissues. C2-C3: No neuroforaminal narrowing or spinal canal stenosis. C3-C4: No neuroforaminal narrowing or spinal canal stenosis. C4-C5: Minimal disc bulge. No neuroforaminal narrowing or spinal canal   stenosis. C5-C6: Small disc osteophyte complex mildly effacing the ventral thecal sac,   unchanged. No neuroforaminal narrowing or spinal canal stenosis. C6-C7: Minimal disc bulge. No neuroforaminal narrowing or spinal canal   stenosis. C7-T1: No neuroforaminal narrowing or spinal canal stenosis. IMPRESSION:  Minimal to mild multilevel degenerative disc disease, worst at C5-C6,   unchanged. No significant neuroforaminal narrowing or spinal canal stenosis. Signing/Reading Doctor: Clau Bahena ([id number])    Approved: Clau Bahena ([id number])  Jul 19 2016  8:34AM                               IR Results (recent):  No results found for this or any previous visit. VAS/US Results (recent):  No results found for this or any previous visit. PHYSICAL EXAM:  General:    Alert, cooperative, no distress, appears stated age. Head:   Normocephalic, without obvious abnormality, atraumatic. Eyes:   Conjunctivae/corneas clear. PERRLA  Nose:  Nares normal. No drainage or sinus tenderness. Throat:    Lips, mucosa, and tongue normal.  No Thrush  Neck:  Supple, symmetrical,  no adenopathy, thyroid: non tender    no carotid bruit and no JVD. Paraspinal tenderness  Back:    Symmetric,  Bilateral tenderness. Lungs:   Clear to auscultation bilaterally. No Wheezing or Rhonchi. No rales. Chest wall:  No tenderness or deformity. No Accessory muscle use. Heart:   Regular rate and rhythm,  no murmur, rub or gallop. Abdomen:   Soft, non-tender. Not distended. Bowel sounds normal. No masses  Extremities: Extremities normal, atraumatic, No cyanosis. No edema. No clubbing  Skin:     Texture, turgor normal. No rashes or lesions. Not Jaundiced  Lymph nodes: Cervical, supraclavicular normal.  Psych:  Good insight. Depressed. Not anxious or agitated. NEUROLOGICAL EXAM:  Appearance: The patient is well developed, well nourished, provides a coherent history and is in no acute distress. Mental Status: Oriented to time, place and person. Mood and affect appropriate. Cranial Nerves:   Intact visual fields. Fundi are benign. BENIGNO, EOM's full, no nystagmus, no ptosis. Facial sensation is normal. Corneal reflexes are intact. Facial movement is symmetric. Hearing is normal bilaterally.  Palate is midline with normal sternocleidomastoid and trapezius muscles are normal. Tongue is midline. Motor:  5/5 strength in upper and lower proximal and distal muscles. Normal bulk and tone. No fasciculations. Reflexes:   Deep tendon reflexes 2+/4 and symmetrical.   Sensory:   Dysesthesia to touch, pinprick and vibration. Gait:  Normal gait. Tremor:   No tremor noted. Cerebellar:  No cerebellar signs present. Neurovascular:  Normal heart sounds and regular rhythm, peripheral pulses intact, and no carotid bruits. Assesment  1. Seizure disorder ([facility]  Stable    2. Partial symptomatic epilepsy with complex partial seizures, not intractable, without status epilepticus (HCC)    Stable  3. Paresthesia  Stable    4. Migraine without aura and without status migrainosus, not intractable  Imitrex 100mg prn    5. Attention deficit disorder (ADD) without hyperactivity    Adderall  6. Chronic pain syndrome  Stable    ___________________________________________________  PLAN:Medication reviewed with patient      ICD-10-CM ICD-9-CM    1. Seizure disorder ([facility] G40.909 345.90    2. Partial symptomatic epilepsy with complex partial seizures, not intractable, without status epilepticus ([facility] G40.209 345.40    3. Paresthesia R20.2 782.0    4. Migraine without aura and without status migrainosus, not intractable G43.009 346.10    5. Attention deficit disorder (ADD) without hyperactivity F98.8 314.00    6. Chronic pain syndrome G89.4 338.4    7. MARISSA (generalized anxiety disorder) F41.1 300.02    8. Medication refill Z76.0 V68.1 dextroamphetamine-amphetamine (ADDERALL) 30 mg tablet      DISCONTINUED: dextroamphetamine-amphetamine (ADDERALL) 30 mg tablet     Follow-up Disposition:  Return in about 2 months (around 6/20/2018).            ___________________________________________________    Total time spent with patient:  []15   []25   []35   [] __ minutes    Care Plan discussed with:    [x]Patient   []Family    []Care Manager []Consultant/Specialist :    ___________________________________________________    Attending Physician: Sid Pritchett MD

## 2018-04-20 NOTE — MR AVS SNAPSHOT
Imer Zamora [address] [facility] 13 
[phone] Patient: Trevor West MRN: [id number] :1965 Visit Information Date & Time Provider Department Dept. Phone Encounter #  
 2018 10:40 AM Duane Serrano MD [facility] [phone] [id number] Follow-up Instructions Return in about 2 months (around 2018). Upcoming Health Maintenance Date Due  
 EYE EXAM RETINAL OR DILATED Q1 1973 Pneumococcal 19-64 Medium Risk (1 of 1 - PPSV23) 1982 FOBT Q 1 YEAR AGE 50-75 2013 DTaP/Tdap/Td series (1 - Tdap) 2016 FOOT EXAM Q1 2017 HEMOGLOBIN A1C Q6M 2017 Influenza Age 5 to Adult 2017 MICROALBUMIN Q1 2017 LIPID PANEL Q1 2017 MEDICARE YEARLY EXAM 3/14/2018 BREAST CANCER SCRN MAMMOGRAM 3/23/2019 PAP AKA CERVICAL CYTOLOGY 2021 Allergies as of 2018  Review Complete On: 2018 By: Shani Mullen No Known Allergies Current Immunizations  Reviewed on 2016 Name Date Td, Adsorbed PF 2016 Not reviewed this visit You Were Diagnosed With   
  
 Codes Comments Seizure disorder ([facility]    -  Primary ICD-10-CM: W48.818 ICD-9-CM: 345.90 Partial symptomatic epilepsy with complex partial seizures, not intractable, without status epilepticus ([facility]     ICD-10-CM: D96.807 ICD-9-CM: 345.40 Paresthesia     ICD-10-CM: R20.2 ICD-9-CM: 782.0 Migraine without aura and without status migrainosus, not intractable     ICD-10-CM: M91.846 ICD-9-CM: 346.10 Attention deficit disorder (ADD) without hyperactivity     ICD-10-CM: F98.8 ICD-9-CM: 314.00 Chronic pain syndrome     ICD-10-CM: G89.4 ICD-9-CM: 338.4 MARISSA (generalized anxiety disorder)     ICD-10-CM: F41.1 ICD-9-CM: 300.02 Medication refill     ICD-10-CM: Z76.0 ICD-9-CM: V68.1 Vitals BP Pulse Height(growth percentile) Weight(growth percentile) LMP SpO2  
 122/78 94 5' 2\" (1.575 m) 117 lb 9.6 oz (53.3 kg) 11/01/2003 (LMP Unknown) 94% BMI OB Status Smoking Status 21.51 kg/m2 Hysterectomy Never Smoker BMI and BSA Data Body Mass Index Body Surface Area  
 21.51 kg/m 2 1.53 m 2 Preferred Pharmacy Pharmacy Name Phone [facility] [address], [address] [facility] [phone] Your Updated Medication List  
  
   
This list is accurate as of 4/20/18 11:46 AM.  Always use your most recent med list.  
  
  
  
  
 amitriptyline 10 mg tablet Commonly known as:  ELAVIL Take 1 Tab by mouth nightly. amLODIPine 10 mg tablet Commonly known as:  NORVASC  
daily. atorvastatin 10 mg tablet Commonly known as:  LIPITOR Take 10 mg by mouth nightly. Cholecalciferol (Vitamin D3) 50,000 unit Cap Take  by mouth every month. dextroamphetamine-amphetamine 30 mg tablet Commonly known as:  ADDERALL Take 1 Tab by mouth two (2) times a dayEarliest Fill Date: 5/20/18. Max Daily Amount: 2 Tabs Start taking on:  5/20/2018  
  
 diazePAM 10 mg tablet Commonly known as:  VALIUM Take 1 Tab by mouth nightly. Max Daily Amount: 10 mg.  
  
 divalproex  mg tablet Commonly known as:  DEPAKOTE Take 1 Tab by mouth two (2) times a day. Indications: Epilepsy  
  
 estradiol 0.01 % (0.1 mg/gram) vaginal cream  
Commonly known as:  ESTRACE Use 1/4 applicatorful in vagina nightly for 3 weeks and then twice a week Lancets Misc  
by Does Not Apply route two (2) times a day. losartan-hydroCHLOROthiazide 100-12.5 mg per tablet Commonly known as:  HYZAAR  
daily. metoprolol tartrate 100 mg IR tablet Commonly known as:  LOPRESSOR 50 mg two (2) times a day. Patient stated she is taking once a day  
  
 oxyCODONE-acetaminophen  mg per tablet Commonly known as:  PERCOCET 10  
 Take 1 Tab by mouth every eight (8) hours as needed for Pain. Max Daily Amount: 3 Tabs. polyethylene glycol 17 gram/dose powder Commonly known as:  Jesus Bimler  
as needed. SUMAtriptan 100 mg tablet Commonly known as:  IMITREX  
1 tab at onset of moderate-severe migraine; may repeat 1 tab in 2 hours; Limit: 2 tabs in 24/ hrs, not more than 3 days a week TOPAMAX 100 mg tablet Generic drug:  topiramate Take 100 mg by mouth daily. TRUE METRIX AIR GLUCOSE METER monitoring kit Generic drug:  Blood-Glucose Meter  
by Does Not Apply route two (2) times a day. TRUE METRIX GLUCOSE TEST STRIP strip Generic drug:  glucose blood VI test strips Prescriptions Printed Refills  
 dextroamphetamine-amphetamine (ADDERALL) 30 mg tablet 0 Starting on: 2018 Sig: Take 1 Tab by mouth two (2) times a dayEarliest Fill Date: 18. Max Daily Amount: 2 Tabs Class: Print Route: Oral  
  
Prescriptions Sent to Pharmacy Refills SUMAtriptan (IMITREX) 100 mg tablet 3 Si tab at onset of moderate-severe migraine; may repeat 1 tab in 2 hours; Limit: 2 tabs in 24/ hrs, not more than 3 days a week Class: Normal  
 Pharmacy: [address] Ph #: [phone]  
 amitriptyline (ELAVIL) 10 mg tablet 2 Sig: Take 1 Tab by mouth nightly. Class: Normal  
 Pharmacy: [address] Ph #: [phone] Route: Oral  
  
Follow-up Instructions Return in about 2 months (around 2018). To-Do List   
 2018 10:10 AM  
  Appointment with [facility] 1 at [facility] ([phone]) Shower or bathe using soap and water.   Do not use deodorant, powder, perfumes, or lotion the day of your exam.  If your prior mammograms were not performed at [facility] facility please bring films with you or forward prior images 2 days before your procedure. Check in at registration 15min before your appointment time unless you were instructed to do otherwise. A script is not necessary, but if you have one, please bring it on the day of the mammogram or have it faxed to the department. [facility] [phone] [facility]  [phone] [facility] 19 [place]  [phone] [facility] [phone] [place] [id number] [facility] [phone] Patient Instructions A Healthy Lifestyle: Care Instructions Your Care Instructions A healthy lifestyle can help you feel good, stay at a healthy weight, and have plenty of energy for both work and play. A healthy lifestyle is something you can share with your whole family. A healthy lifestyle also can lower your risk for serious health problems, such as high blood pressure, heart disease, and diabetes. You can follow a few steps listed below to improve your health and the health of your family. Follow-up care is a key part of your treatment and safety. Be sure to make and go to all appointments, and call your doctor if you are having problems. It's also a good idea to know your test results and keep a list of the medicines you take. How can you care for yourself at home? · Do not eat too much sugar, fat, or fast foods. You can still have dessert and treats now and then. The goal is moderation. · Start small to improve your eating habits. Pay attention to portion sizes, drink less juice and soda pop, and eat more fruits and vegetables. ¨ Eat a healthy amount of food. A 3-ounce serving of meat, for example, is about the size of a deck of cards. Fill the rest of your plate with vegetables and whole grains. ¨ Limit the amount of soda and sports drinks you have every day. Drink more water when you are thirsty. ¨ Eat at least 5 servings of fruits and vegetables every day.  It may seem like a lot, but it is not hard to reach this goal. A serving or helping is 1 piece of fruit, 1 cup of vegetables, or 2 cups of leafy, raw vegetables. Have an apple or some carrot sticks as an afternoon snack instead of a candy bar. Try to have fruits and/or vegetables at every meal. 
· Make exercise part of your daily routine. You may want to start with simple activities, such as walking, bicycling, or slow swimming. Try to be active 30 to 60 minutes every day. You do not need to do all 30 to 60 minutes all at once. For example, you can exercise 3 times a day for 10 or 20 minutes. Moderate exercise is safe for most people, but it is always a good idea to talk to your doctor before starting an exercise program. 
· Keep moving. Kavitha Chuar the lawn, work in the garden, or [employer]. Take the stairs instead of the elevator at work. · If you smoke, quit. People who smoke have an increased risk for heart attack, stroke, cancer, and other lung illnesses. Quitting is hard, but there are ways to boost your chance of quitting tobacco for good. ¨ Use nicotine gum, patches, or lozenges. ¨ Ask your doctor about stop-smoking programs and medicines. ¨ Keep trying. In addition to reducing your risk of diseases in the future, you will notice some benefits soon after you stop using tobacco. If you have shortness of breath or asthma symptoms, they will likely get better within a few weeks after you quit. · Limit how much alcohol you drink. Moderate amounts of alcohol (up to 2 drinks a day for men, 1 drink a day for women) are okay. But drinking too much can lead to liver problems, high blood pressure, and other health problems. Family health If you have a family, there are many things you can do together to improve your health. · Eat meals together as a family as often as possible. · Eat healthy foods. This includes fruits, vegetables, lean meats and dairy, and whole grains. · Include your family in your fitness plan.  Most people think of activities such as jogging or tennis as the way to fitness, but there are many ways you and your family can be more active. Anything that makes you breathe hard and gets your heart pumping is exercise. Here are some tips: 
¨ Walk to do errands or to take your child to school or the bus. ¨ Go for a family bike ride after dinner instead of watching TV. Where can you learn more? Go to http://nick-shoaib.info/. Enter I518 in the search box to learn more about \"A Healthy Lifestyle: Care Instructions. \" Current as of: May 12, 2017 Content Version: 11.4 © 3206-5282 [employer]. Care instructions adapted under license by [employer] (which disclaims liability or warranty for this information). If you have questions about a medical condition or this instruction, always ask your healthcare professional. [address] any warranty or liability for your use of this information. Please be advised there is a $25 fee for all paperwork to be completed from our  providers. This is to be paid by the patient prior to picking up the completed forms. Introducing [facility]! Dear Herman Gutierrez: Thank you for requesting a [email or web address] account. Our records indicate that you already have an active [email or web address] account. You can access your account anytime at [email or web address] Did you know that you can access your hospital and ER discharge instructions at any time in [email or web address]? You can also review all of your test results from your hospital stay or ER visit. Additional Information If you have questions, please visit the Frequently Asked Questions section of the [email or web address] website at [email or web address] Remember, [email or web address] is NOT to be used for urgent needs. For medical emergencies, dial 911. Now available from your iPhone and Android! Please provide this summary of care documentation to your next provider. Your primary care clinician is listed as Emilia Clark. If you have any questions after today's visit, please call [phone].

## 2018-04-20 NOTE — PATIENT INSTRUCTIONS
A Healthy Lifestyle: Care Instructions  Your Care Instructions    A healthy lifestyle can help you feel good, stay at a healthy weight, and have plenty of energy for both work and play. A healthy lifestyle is something you can share with your whole family. A healthy lifestyle also can lower your risk for serious health problems, such as high blood pressure, heart disease, and diabetes. You can follow a few steps listed below to improve your health and the health of your family. Follow-up care is a key part of your treatment and safety. Be sure to make and go to all appointments, and call your doctor if you are having problems. It's also a good idea to know your test results and keep a list of the medicines you take. How can you care for yourself at home? · Do not eat too much sugar, fat, or fast foods. You can still have dessert and treats now and then. The goal is moderation. · Start small to improve your eating habits. Pay attention to portion sizes, drink less juice and soda pop, and eat more fruits and vegetables. ¨ Eat a healthy amount of food. A 3-ounce serving of meat, for example, is about the size of a deck of cards. Fill the rest of your plate with vegetables and whole grains. ¨ Limit the amount of soda and sports drinks you have every day. Drink more water when you are thirsty. ¨ Eat at least 5 servings of fruits and vegetables every day. It may seem like a lot, but it is not hard to reach this goal. A serving or helping is 1 piece of fruit, 1 cup of vegetables, or 2 cups of leafy, raw vegetables. Have an apple or some carrot sticks as an afternoon snack instead of a candy bar. Try to have fruits and/or vegetables at every meal.  · Make exercise part of your daily routine. You may want to start with simple activities, such as walking, bicycling, or slow swimming. Try to be active 30 to 60 minutes every day. You do not need to do all 30 to 60 minutes all at once.  For example, you can exercise 3 times a day for 10 or 20 minutes. Moderate exercise is safe for most people, but it is always a good idea to talk to your doctor before starting an exercise program.  · Keep moving. Caleen Newer the lawn, work in the garden, or [employer]. Take the stairs instead of the elevator at work. · If you smoke, quit. People who smoke have an increased risk for heart attack, stroke, cancer, and other lung illnesses. Quitting is hard, but there are ways to boost your chance of quitting tobacco for good. ¨ Use nicotine gum, patches, or lozenges. ¨ Ask your doctor about stop-smoking programs and medicines. ¨ Keep trying. In addition to reducing your risk of diseases in the future, you will notice some benefits soon after you stop using tobacco. If you have shortness of breath or asthma symptoms, they will likely get better within a few weeks after you quit. · Limit how much alcohol you drink. Moderate amounts of alcohol (up to 2 drinks a day for men, 1 drink a day for women) are okay. But drinking too much can lead to liver problems, high blood pressure, and other health problems. Family health  If you have a family, there are many things you can do together to improve your health. · Eat meals together as a family as often as possible. · Eat healthy foods. This includes fruits, vegetables, lean meats and dairy, and whole grains. · Include your family in your fitness plan. Most people think of activities such as jogging or tennis as the way to fitness, but there are many ways you and your family can be more active. Anything that makes you breathe hard and gets your heart pumping is exercise. Here are some tips:  ¨ Walk to do errands or to take your child to school or the bus. ¨ Go for a family bike ride after dinner instead of watching TV. Where can you learn more? Go to http://nick-shoaib.info/. Enter Y360 in the search box to learn more about \"A Healthy Lifestyle: Care Instructions. \"  Current as of: May 12, 2017  Content Version: 11.4  © 6878-2911 [employer], [employer]. Care instructions adapted under license by [employer] (which disclaims liability or warranty for this information). If you have questions about a medical condition or this instruction, always ask your healthcare professional. [address] any warranty or liability for your use of this information. Please be advised there is a $25 fee for all paperwork to be completed from our  providers. This is to be paid by the patient prior to picking up the completed forms.

## 2018-05-01 ENCOUNTER — TELEPHONE (OUTPATIENT)
Dept: NEUROLOGY | Age: 53
End: 2018-05-01

## 2018-05-04 NOTE — TELEPHONE ENCOUNTER
Patient would like to know if she should continue with Valium due to the prescription .  Please advise

## 2018-05-10 NOTE — PROGRESS NOTES
Chief Complaint   Patient presents with    Seizure    Medication Refill    Numbness     bilateral hands fingertips per patient times three months     1. Have you been to the ER, urgent care clinic since your last visit? Hospitalized since your last visit? no    2. Have you seen or consulted any other health care providers outside of the [address] [place] [address] since your last visit? Include any pap smears or colon screening.  no    Pill count not performed patient did not bring medication  Pill count not verified with patient  Patient reports taking medication    UDS obtained and sent   Pain contract    made available for Dr. Miguel Jonas given for [facility] 52

## 2018-06-20 ENCOUNTER — OFFICE VISIT (OUTPATIENT)
Dept: NEUROLOGY | Age: 53
End: 2018-06-20

## 2018-06-20 VITALS
OXYGEN SATURATION: 97 % | HEART RATE: 85 BPM | WEIGHT: 119.4 LBS | RESPIRATION RATE: 16 BRPM | TEMPERATURE: 98.2 F | HEIGHT: 62 IN | DIASTOLIC BLOOD PRESSURE: 79 MMHG | SYSTOLIC BLOOD PRESSURE: 121 MMHG | BODY MASS INDEX: 21.97 KG/M2

## 2018-06-20 DIAGNOSIS — G43.009 MIGRAINE WITHOUT AURA AND WITHOUT STATUS MIGRAINOSUS, NOT INTRACTABLE: ICD-10-CM

## 2018-06-20 DIAGNOSIS — G40.209 PARTIAL SYMPTOMATIC EPILEPSY WITH COMPLEX PARTIAL SEIZURES, NOT INTRACTABLE, WITHOUT STATUS EPILEPTICUS (HCC): ICD-10-CM

## 2018-06-20 DIAGNOSIS — F41.1 GAD (GENERALIZED ANXIETY DISORDER): ICD-10-CM

## 2018-06-20 DIAGNOSIS — G40.909 SEIZURE DISORDER (HCC): Primary | ICD-10-CM

## 2018-06-20 DIAGNOSIS — M79.18 MYOFASCIAL MUSCLE PAIN: ICD-10-CM

## 2018-06-20 DIAGNOSIS — R20.2 PARESTHESIA: ICD-10-CM

## 2018-06-20 DIAGNOSIS — Z76.0 MEDICATION REFILL: ICD-10-CM

## 2018-06-20 RX ORDER — DEXTROAMPHETAMINE SACCHARATE, AMPHETAMINE ASPARTATE, DEXTROAMPHETAMINE SULFATE AND AMPHETAMINE SULFATE 7.5; 7.5; 7.5; 7.5 MG/1; MG/1; MG/1; MG/1
30 TABLET ORAL 2 TIMES DAILY
Qty: 60 TAB | Refills: 0 | Status: SHIPPED | OUTPATIENT
Start: 2018-06-20 | End: 2018-06-20 | Stop reason: SDUPTHER

## 2018-06-20 RX ORDER — AMITRIPTYLINE HYDROCHLORIDE 10 MG/1
10 TABLET, FILM COATED ORAL
Qty: 30 TAB | Refills: 2 | Status: SHIPPED | OUTPATIENT
Start: 2018-06-20

## 2018-06-20 RX ORDER — DEXTROAMPHETAMINE SACCHARATE, AMPHETAMINE ASPARTATE, DEXTROAMPHETAMINE SULFATE AND AMPHETAMINE SULFATE 7.5; 7.5; 7.5; 7.5 MG/1; MG/1; MG/1; MG/1
30 TABLET ORAL 2 TIMES DAILY
Qty: 60 TAB | Refills: 0 | Status: SHIPPED | OUTPATIENT
Start: 2018-07-20

## 2018-06-20 RX ORDER — DIVALPROEX SODIUM 500 MG/1
500 TABLET, DELAYED RELEASE ORAL 2 TIMES DAILY
Qty: 60 TAB | Refills: 3 | Status: SHIPPED | OUTPATIENT
Start: 2018-06-20

## 2018-06-20 RX ORDER — ZOLMITRIPTAN 5 MG/1
5 TABLET, ORALLY DISINTEGRATING ORAL AS NEEDED
Qty: 9 TAB | Refills: 3 | Status: SHIPPED | OUTPATIENT
Start: 2018-06-20

## 2018-06-20 NOTE — MR AVS SNAPSHOT
[address] [place] [address] 
[phone] Patient: Lucia Sawyer MRN: [id number] :[id number] Visit Information Date & Time Provider Department Dept. Phone Encounter #  
 2018  9:40 AM Duane Cabrera MD [facility] [phone] [id number] Follow-up Instructions Return if symptoms worsen or fail to improve. Upcoming Health Maintenance Date Due  
 EYE EXAM RETINAL OR DILATED Q1 1973 Pneumococcal 19-64 Medium Risk (1 of 1 - PPSV23) 1982 FOBT Q 1 YEAR AGE 50-75 2013 DTaP/Tdap/Td series (1 - Tdap) 2016 FOOT EXAM Q1 2017 HEMOGLOBIN A1C Q6M 2017 MICROALBUMIN Q1 2017 LIPID PANEL Q1 2017 MEDICARE YEARLY EXAM 3/14/2018 Influenza Age 5 to Adult 2018 BREAST CANCER SCRN MAMMOGRAM 3/23/2019 PAP AKA CERVICAL CYTOLOGY 2021 Allergies as of 2018  Review Complete On: 2018 By: Jose Luis Lubin MD  
 No Known Allergies Current Immunizations  Reviewed on 2016 Name Date Td, Adsorbed PF 2016 Not reviewed this visit You Were Diagnosed With   
  
 Codes Comments Seizure disorder ([facility]    -  Primary ICD-10-CM: O20.186 ICD-9-CM: 345.90 Myofascial muscle pain     ICD-10-CM: M79.1 ICD-9-CM: 729.1 Migraine without aura and without status migrainosus, not intractable     ICD-10-CM: B26.565 ICD-9-CM: 346.10 MARISSA (generalized anxiety disorder)     ICD-10-CM: F41.1 ICD-9-CM: 300.02 Paresthesia     ICD-10-CM: R20.2 ICD-9-CM: 782.0 Partial symptomatic epilepsy with complex partial seizures, not intractable, without status epilepticus ([facility]     ICD-10-CM: B36.380 ICD-9-CM: 345.40 Medication refill     ICD-10-CM: Z76.0 ICD-9-CM: V68.1 Vitals BP Pulse Temp Resp Height(growth percentile) Weight(growth percentile) 121/79 (BP 1 Location: Right arm, BP Patient Position: Sitting) 85 98.2 °F (36.8 °C) (Temporal) 16 5' 2\" (1.575 m) 119 lb 6.4 oz (54.2 kg) LMP SpO2 BMI OB Status Smoking Status 11/01/2003 (LMP Unknown) 97% 21.84 kg/m2 Hysterectomy Never Smoker BMI and BSA Data Body Mass Index Body Surface Area  
 21.84 kg/m 2 1.54 m 2 Preferred Pharmacy Pharmacy Name Phone [facility] [place] [id number], [address] [place] [address] [phone] Your Updated Medication List  
  
   
This list is accurate as of 6/20/18 10:28 AM.  Always use your most recent med list.  
  
  
  
  
 amitriptyline 10 mg tablet Commonly known as:  ELAVIL Take 1 Tab by mouth nightly. amLODIPine 10 mg tablet Commonly known as:  NORVASC  
daily. atorvastatin 10 mg tablet Commonly known as:  LIPITOR Take 10 mg by mouth nightly. cholecalciferol 50,000 unit capsule Commonly known as:  VITAMIN D3 Take  by mouth every month. dextroamphetamine-amphetamine 30 mg tablet Commonly known as:  ADDERALL Take 1 Tab by mouth two (2) times a dayEarliest Fill Date: 7/20/18. Max Daily Amount: 2 Tabs Start taking on:  7/20/2018  
  
 diazePAM 10 mg tablet Commonly known as:  VALIUM Take 1 Tab by mouth nightly. Max Daily Amount: 10 mg.  
  
 divalproex  mg tablet Commonly known as:  DEPAKOTE Take 1 Tab by mouth two (2) times a day. Indications: epilepsy  
  
 estradiol 0.01 % (0.1 mg/gram) vaginal cream  
Commonly known as:  ESTRACE Use 1/4 applicatorful in vagina nightly for 3 weeks and then twice a week Lancets Misc  
by Does Not Apply route two (2) times a day. losartan-hydroCHLOROthiazide 100-12.5 mg per tablet Commonly known as:  HYZAAR  
daily. metoprolol tartrate 100 mg IR tablet Commonly known as:  LOPRESSOR 50 mg two (2) times a day. Patient stated she is taking once a day  
  
 oxyCODONE-acetaminophen  mg per tablet Commonly known as:  PERCOCET 10 Take 1 Tab by mouth every eight (8) hours as needed for Pain. Max Daily Amount: 3 Tabs. polyethylene glycol 17 gram/dose powder Commonly known as:  [place]  
as needed. TOPAMAX 100 mg tablet Generic drug:  topiramate Take 100 mg by mouth daily. TRUE METRIX AIR GLUCOSE METER monitoring kit Generic drug:  Blood-Glucose Meter  
by Does Not Apply route two (2) times a day. TRUE METRIX GLUCOSE TEST STRIP strip Generic drug:  glucose blood VI test strips ZOLMitriptan 5 mg disintegrating tablet Commonly known as:  ZOMIG-ZMT Take 1 Tab by mouth as needed for Migraine. Prescriptions Printed Refills  
 dextroamphetamine-amphetamine (ADDERALL) 30 mg tablet 0 Starting on: 7/20/2018 Sig: Take 1 Tab by mouth two (2) times a dayEarliest Fill Date: 7/20/18. Max Daily Amount: 2 Tabs Class: Print Route: Oral  
  
Prescriptions Sent to Pharmacy Refills ZOLMitriptan (ZOMIG-ZMT) 5 mg disintegrating tablet 3 Sig: Take 1 Tab by mouth as needed for Migraine. Class: Normal  
 Pharmacy: [address] Ph #: [phone] Route: Oral  
 divalproex DR (DEPAKOTE) 500 mg tablet 3 Sig: Take 1 Tab by mouth two (2) times a day. Indications: epilepsy Class: Normal  
 Pharmacy: [address] Ph #: [phone] Route: Oral  
 amitriptyline (ELAVIL) 10 mg tablet 2 Sig: Take 1 Tab by mouth nightly. Class: Normal  
 Pharmacy: [address] Ph #: [phone] Route: Oral  
  
Follow-up Instructions Return if symptoms worsen or fail to improve. To-Do List   
 07/05/2018 1:20 PM  
  Appointment with [address] [facility] at [facility] ([phone]) Shower or bathe using soap and water.   Do not use deodorant, powder, perfumes, or lotion the day of your exam.  If your prior mammograms were not performed at [facility] please bring films with you or forward prior images 2 days before your procedure. Check in at registration 15min before your appointment time unless you were instructed to do otherwise. A script is not necessary, but if you have one, please bring it on the day of the mammogram or have it faxed to the department. You are responsible for finding a method of transportation to your appointment. If you don't have transportation, please reschedule your appointment at least 24 hours in advance. [facility] [phone] [facility]  [phone] [address]  [phone] [facility] [phone] [place] [id number] [facility] [phone] [facility] SERVICES! Dear Darylene Lace: Thank you for requesting a [employer] account. Our records indicate that you already have an active [employer] account. You can access your account anytime at [email or web address] Did you know that you can access your hospital and ER discharge instructions at any time in [employer]? You can also review all of your test results from your hospital stay or ER visit. Additional Information If you have questions, please visit the Frequently Asked Questions section of the Interactive Project website at [email or web address] Remember, Interactive Project is NOT to be used for urgent needs. For medical emergencies, dial 911. Now available from your iPhone and Android! Please provide this summary of care documentation to your next provider. Your primary care clinician is listed as Lalita Major. If you have any questions after today's visit, please call [phone].

## 2018-06-20 NOTE — PROGRESS NOTES
Neurology Progress Note    NAME:  Lucia Driver   :   1965   MRN:   [id number]     Date/Time: 2018  Subjective:      Lucia Driver is a 52 y.o. female here today for follow up for seizures, headache, pain. Patient today says headache has been every other day, headache is throbbing in nature, occasional sharp pain from the back headache, aching and shooting pain going down to the arms and hands. She however attributed increasing headache frequency to the transition that she is going through, trying to pack her things and relocate. Patient [place] out of state, and currently her daughter is in the hospital.  She thinks there is a lot of stress going on. Patient says Imitrex is upsetting her stomach, causing her to have nausea and sometimes pain. At this time, I will change Imitrex to Maxalt MLT, this is because Maxalt MLT dissolves in the mouth under the tongue on bypasses GI or liver metabolism. Of note, patient has J-tube in situ. Patient states she had one seizure which she was aware of, as patient has been doing fairly well, I would attribute the breakthrough seizure to the stress that is currently going on, I would not make any change as per medication for seizures. Review of Systems - General ROS: positive for  - fatigue, night sweats and sleep disturbance  Psychological ROS: positive for - anxiety and sleep disturbances, difficulty focusing.   Ophthalmic ROS: positive for - blurry vision, decreased vision and photophobia  ENT ROS: positive for - headaches, vertigo and visual changes  Allergy and Immunology ROS: negative  Hematological and Lymphatic ROS: negative  Endocrine ROS: negative  Respiratory ROS: no cough, shortness of breath, or wheezing  Cardiovascular ROS: no chest pain or dyspnea on exertion  Gastrointestinal ROS: no abdominal pain, change in bowel habits, or black or bloody stools  Genito-Urinary ROS: no dysuria, trouble voiding, or hematuria  Musculoskeletal ROS: positive for - joint pain, joint swelling, muscle pain and muscular weakness  Neurological ROS: positive for - dizziness, gait disturbance, headaches, impaired coordination/balance, numbness/tingling, visual changes and weakness  Dermatological ROS: negative      Medications reviewed:  Current Outpatient Prescriptions   Medication Sig Dispense Refill    ZOLMitriptan (ZOMIG-ZMT) 5 mg disintegrating tablet Take 1 Tab by mouth as needed for Migraine. 9 Tab 3    divalproex DR (DEPAKOTE) 500 mg tablet Take 1 Tab by mouth two (2) times a day. Indications: epilepsy 60 Tab 3    amitriptyline (ELAVIL) 10 mg tablet Take 1 Tab by mouth nightly. 30 Tab 2    [START ON 7/20/2018] dextroamphetamine-amphetamine (ADDERALL) 30 mg tablet Take 1 Tab by mouth two (2) times a dayEarliest Fill Date: 7/20/18. Max Daily Amount: 2 Tabs 60 Tab 0    estradiol (ESTRACE) 0.01 % (0.1 mg/gram) vaginal cream Use 1/4 applicatorful in vagina nightly for 3 weeks and then twice a week 1 Tube 6    amLODIPine (NORVASC) 10 mg tablet daily.  TRUE METRIX GLUCOSE TEST STRIP strip       Cholecalciferol, Vitamin D3, 50,000 unit cap Take  by mouth every month.  Lancets misc by Does Not Apply route two (2) times a day.  Blood-Glucose Meter (TRUE METRIX AIR GLUCOSE METER) monitoring kit by Does Not Apply route two (2) times a day.  atorvastatin (LIPITOR) 10 mg tablet Take 10 mg by mouth nightly.  oxyCODONE-acetaminophen (PERCOCET 10)  mg per tablet Take 1 Tab by mouth every eight (8) hours as needed for Pain. Max Daily Amount: 3 Tabs. 60 Tab 0    metoprolol tartrate (LOPRESSOR) 100 mg IR tablet 50 mg two (2) times a day. Patient stated she is taking once a day      polyethylene glycol (MIRALAX) 17 gram/dose powder as needed.  diazePAM (VALIUM) 10 mg tablet Take 1 Tab by mouth nightly. Max Daily Amount: 10 mg. 30 Tab 3    losartan-hydroCHLOROthiazide (HYZAAR) 100-12.5 mg per tablet daily.       topiramate (TOPAMAX) 100 mg tablet Take 100 mg by mouth daily. Objective:   Vitals:  Vitals:    06/20/18 0936   BP: 121/79   Pulse: 85   Resp: 16   Temp: 98.2 °F (36.8 °C)   TempSrc: Temporal   SpO2: 97%   Weight: 119 lb 6.4 oz (54.2 kg)   Height: 5' 2\" (1.575 m)   PainSc:   6   PainLoc: Abdomen   LMP: 11/01/2003       Lab Data Reviewed:  Lab Results   Component Value Date/Time    WBC 5.9 01/27/2017 01:45 PM    HCT 41.3 01/27/2017 01:45 PM    HGB 13.7 01/27/2017 01:45 PM    PLATELET 725 97/07/4632 01:45 PM       Lab Results   Component Value Date/Time    Sodium 143 01/27/2017 01:45 PM    Potassium 3.3 (L) 01/27/2017 01:45 PM    Chloride 105 01/27/2017 01:45 PM    CO2 26 01/27/2017 01:45 PM    Glucose 93 01/27/2017 01:45 PM    BUN 26 (H) 01/27/2017 01:45 PM    Creatinine 1.12 (H) 01/27/2017 01:45 PM    Calcium 8.9 01/27/2017 01:45 PM       No components found for: TROPQUANT    No results found for: KRIS      Lab Results   Component Value Date/Time    Hemoglobin A1c 6.4 (H) 01/27/2017 01:45 PM    Hemoglobin A1c (POC) 9.4 05/25/2016 10:42 AM        Lab Results   Component Value Date/Time    Vitamin B12 1367 (H) 11/05/2016 05:20 AM    Folate 20.3 11/05/2016 05:20 AM       No results found for: KRIS, Russell Webby, XBANA    Lab Results   Component Value Date/Time    Cholesterol, total 200 (H) 02/23/2016 11:00 AM    HDL Cholesterol 75 02/23/2016 11:00 AM    LDL, calculated 106 (H) 02/23/2016 11:00 AM    VLDL, calculated 19 02/23/2016 11:00 AM    Triglyceride 94 02/23/2016 11:00 AM         CT Results (recent):    Results from Hospital Encounter encounter on 01/27/17   CT HEAD WO CONT   Narrative CLINICAL HISTORY: Syncope    INDICATION: Syncope    COMPARISON: 11/4/2016      CT dose reduction was achieved through use of a standardized protocol tailored  for this examination and automatic exposure control for dose modulation.        TECHNIQUE: Serial axial images with a collimation of 5 mm were obtained from the  skull base through the vertex      FINDINGS: The ventricles, cisterns, and cortical sulci are normal. The  gray-white matter demarcation is normal. No abnormal mass is identified. There  is no evidence of an acute infarction, hemorrhage, or mass-effect. There is no  evidence of midline shift or hydrocephalus. Posterior fossa structures are  unremarkable. No extra-axial collections are seen. Mastoid air cells and the visualized paranasal sinuses are well pneumatized and  clear. There is no evidence of depressed skull fractures of soft tissue  swelling. Impression IMPRESSION:     Normal CT scan of the head. MRI Results (recent):    Results from [place] encounter on 07/19/16   MRI CERV SPINE WO CONT   Narrative **Final Report**      ICD Codes / Adm. Diagnosis: 723.4  782.0 / Brachial neuritis or radiculit    Disturbance of skin sensatio  Examination:  MR ESPINAL SPINE WO CON  - [id number] - Jul 19 2016  7:33AM  Accession No:  [id number]  Reason:  cervical radiculopathy, d/o sensation      REPORT:  Study:  C SPINE WO CON    Indication:  cervical radiculopathy, d/o sensation    Additional clinical history: Brachial neuritis or radiculit Disturbance of   skin sensation    Comparison:  5/2/2013    Contrast: None administered    Technique:  Magnetic resonance images of the cervical spine were obtained. The following sequences were obtained: Sagittal T1, T2, T2 STIR; axial T1,   gradient echo, and T2. Findings: There is straightening of normal cervical lordosis. There is no subluxation. Minimal to mild disc space narrowing is seen from C2-C3 through C5-C6. Minimal osteophytic bony endplate changes are seen at multiple levels. Normal signal is seen in the spinal cord and canal. No concerning signal   changes are seen in the visualized paraspinal soft tissues. C2-C3: No neuroforaminal narrowing or spinal canal stenosis. C3-C4: No neuroforaminal narrowing or spinal canal stenosis.     C4-C5: Minimal disc bulge. No neuroforaminal narrowing or spinal canal   stenosis. C5-C6: Small disc osteophyte complex mildly effacing the ventral thecal sac,   unchanged. No neuroforaminal narrowing or spinal canal stenosis. C6-C7: Minimal disc bulge. No neuroforaminal narrowing or spinal canal   stenosis. C7-T1: No neuroforaminal narrowing or spinal canal stenosis. IMPRESSION:  Minimal to mild multilevel degenerative disc disease, worst at C5-C6,   unchanged. No significant neuroforaminal narrowing or spinal canal stenosis. Signing/Reading Doctor: Jaquan Huber ([id number])    Approved: Jaquan Huber ([id number])  Jul 19 2016  8:34AM                               IR Results (recent):  No results found for this or any previous visit. VAS/US Results (recent):  No results found for this or any previous visit. PHYSICAL EXAM:  General:    Alert, cooperative, no distress, appears stated age. Head:   Normocephalic, without obvious abnormality, atraumatic. Eyes:   Conjunctivae/corneas clear. PERRLA  Nose:  Nares normal. No drainage or sinus tenderness. Throat:    Lips, mucosa, and tongue normal.  No Thrush  Neck:  Supple, symmetrical,  no adenopathy, thyroid: non tender    no carotid bruit and no JVD. Paraspinal tenderness  Back:    Symmetric, bilateral tenderness. Lungs:   Clear to auscultation bilaterally. No Wheezing or Rhonchi. No rales. Chest wall:  No tenderness or deformity. No Accessory muscle use. Heart:   Regular rate and rhythm,  no murmur, rub or gallop. Abdomen:   Soft, non-tender. Not distended. Bowel sounds normal. No masses  Extremities: Extremities normal, atraumatic, No cyanosis. No edema. No clubbing  Skin:     Texture, turgor normal. No rashes or lesions. Not Jaundiced  Lymph nodes: Cervical, supraclavicular normal.  Psych:  Good insight. Not depressed. Anxious . NEUROLOGICAL EXAM:  Appearance:   The patient is well developed, well nourished, provides a coherent history and is in no acute distress. Mental Status: Oriented to time, place and person. Mood and affect appropriate. Cranial Nerves:   Intact visual fields. Fundi are benign. BENIGNO, EOM's full, no nystagmus, no ptosis. Facial sensation is normal. Corneal reflexes are intact. Facial movement is symmetric. Hearing is normal bilaterally. Palate is midline with normal sternocleidomastoid and trapezius muscles are normal. Tongue is midline. Motor:  5/5 strength in upper and lower proximal and distal muscles. Normal bulk and tone. No fasciculations. Reflexes:   Deep tendon reflexes 2+/4 and symmetrical.   Sensory:   Dysesthesia to touch, pinprick and vibration. Gait:  Normal gait. Tremor:   Mild tremor noted. Cerebellar:  No cerebellar signs present. Neurovascular:  Normal heart sounds and regular rhythm, peripheral pulses intact, and no carotid bruits. Assesment  1. Seizure disorder ([facility]  Stable  Continue management    2. Myofascial muscle pain  Continue management    3. Migraine without aura and without status migrainosus, not intractable  Continue management    4. MARISSA (generalized anxiety disorder)  Following psychiatry    5. Paresthesia  Continue management    6. Partial symptomatic epilepsy with complex partial seizures, not intractable, without status epilepticus ([facility]    Continue management  ___________________________________________________  PLAN: Medication reviewed with patient      ICD-10-CM ICD-9-CM    1. Seizure disorder ([facility] G40.909 345.90 divalproex DR (DEPAKOTE) 500 mg tablet   2. Myofascial muscle pain M79.1 729.1    3. Migraine without aura and without status migrainosus, not intractable G43.009 346.10    4. MARISSA (generalized anxiety disorder) F41.1 300.02    5. Paresthesia R20.2 782.0    6. Partial symptomatic epilepsy with complex partial seizures, not intractable, without status epilepticus ([facility] G40.209 345.40    7.  Medication refill Z76.0 V68.1 dextroamphetamine-amphetamine (ADDERALL) 30 mg tablet      DISCONTINUED: dextroamphetamine-amphetamine (ADDERALL) 30 mg tablet     Follow-up Disposition:  Return if symptoms worsen or fail to improve.           ___________________________________________________    Total time spent with patient:  []15   []25   []35   [] __ minutes    Care Plan discussed with:    [x]Patient   []Family    []Care Manager   []Consultant/Specialist :    ___________________________________________________    Attending Physician: Jenn Bang MD

## 2018-06-20 NOTE — PROGRESS NOTES
Chief Complaint   Patient presents with    Seizure     1. Have you been to the ER, urgent care clinic since your last visit? Hospitalized since your last visit? no    2. Have you seen or consulted any other health care providers outside of the [facility] since your last visit? Include any pap smears or colon screening.  No      Pill count not performed patient did not bring medications   Pill count not verified with patient  Patient reports taking medication    UDS obtained 2/19/18  Pain contract on file   not available   Script given for Adderall (june and July)

## 2018-06-22 DIAGNOSIS — G40.909 SEIZURE DISORDER (HCC): Primary | ICD-10-CM

## 2018-06-26 RX ORDER — RIZATRIPTAN BENZOATE 10 MG/1
10 TABLET ORAL
Qty: 9 TAB | Refills: 3 | Status: SHIPPED | OUTPATIENT
Start: 2018-06-26 | End: 2018-06-26

## 2018-06-26 NOTE — TELEPHONE ENCOUNTER
Called patient, ID verified times 2. Patient stated the Zomig was not covered. Patient made aware if a PA is needed have the pharmacy fax over notification.

## 2018-07-05 ENCOUNTER — HOSPITAL ENCOUNTER (OUTPATIENT)
Dept: MAMMOGRAPHY | Age: 53
Discharge: HOME | End: 2018-07-05
Attending: FAMILY MEDICINE
Payer: MEDICARE

## 2018-07-05 DIAGNOSIS — Z12.31 VISIT FOR SCREENING MAMMOGRAM: ICD-10-CM

## 2018-07-05 PROCEDURE — 77067 SCR MAMMO BI INCL CAD: CPT

## 2019-01-28 ENCOUNTER — TELEPHONE (OUTPATIENT)
Dept: NEUROLOGY | Age: 54
End: 2019-01-28

## 2019-01-28 NOTE — TELEPHONE ENCOUNTER
Returned call to patient, ID verifed times 2. Patient would like her medical records. Patient made aware we can mail her a release form due to she is out of stated. Medical release form mailed to [phone]. [address] [facility]. [phone].

## 2019-07-18 ENCOUNTER — TELEPHONE (OUTPATIENT)
Dept: OBGYN CLINIC | Age: 54
End: 2019-07-18

## 2019-07-18 NOTE — TELEPHONE ENCOUNTER
I call pt to schedule an annual exam for 2019. Pt informed me that she do not live in [facility] anymore.

## 2023-03-18 NOTE — ADDENDUM NOTE
Addended by: Harleen Bull on: 1/23/2018 01:20 PM     Modules accepted: Orders Yes - the patient is able to be screened

## 2025-04-03 NOTE — MR AVS SNAPSHOT
Blood Glucose Goals    Start checking your blood sugars 4 times/day:  1.) Fasting (before your first meal of the day)  2.) 2 hours after breakfast  3.) 2 hours after lunch  4.) 2 hours after dinner    Bring your recorded glucose log sheet and food log sheet to your follow up visit in 2 weeks for review.    Ranges:   Fasting: less than 95 mg/dL  2 hours after meal: less than 120 mg/dL    Food Goals    Healthy, well rounded diet with plenty of water.    At least 175 grams of carbohydrates a day.    If 3 full meals is too much for you, aim for small frequent snacks.     Avoid going longer than 5 hours between meals/snacks.    Meal Plan:  Spreading out our carbohydrates throughout the day and adding in small amounts of movement after meals can help us deal with the glucose better.     Breakfast: 30 grams of carbohydrates.  AM Snack: 15 grams of carbohydrates.  Lunch: 45 grams of carbohydrates.  PM Snack: 15 grams of carbohydrates.  Dinner: 45 grams of carbohydrates.  Bedtime Snack: 30 grams of carbohydrates.    Anne Lazar, RD, , LD, CDCES(she/her/hers)  Diabetes Educator  [facility] [place]  [email or web address]  [phone] phone  [phone] Fax     Visit Information Date & Time Provider Department Dept. Phone Encounter #  
 9/18/2017  9:40 AM Duane Chiu MD [facility] [phone] [id number] Follow-up Instructions Return in about 3 months (around 12/18/2017). Upcoming Health Maintenance Date Due  
 EYE EXAM RETINAL OR DILATED Q1 1/8/1973 Pneumococcal 19-64 Medium Risk (1 of 1 - PPSV23) 1/8/1982 FOBT Q 1 YEAR AGE 50-75 1/8/2013 DTaP/Tdap/Td series (1 - Tdap) 2/24/2016 FOOT EXAM Q1 2/18/2017 HEMOGLOBIN A1C Q6M 7/27/2017 INFLUENZA AGE 9 TO ADULT 8/1/2017 MICROALBUMIN Q1 9/7/2017 LIPID PANEL Q1 9/7/2017 PAP AKA CERVICAL CYTOLOGY 12/21/2018 BREAST CANCER SCRN MAMMOGRAM 3/23/2019 Allergies as of 9/18/2017  Review Complete On: 9/18/2017 By: Benetta Cabot, MD  
 No Known Allergies Current Immunizations  Reviewed on 2/23/2016 Name Date Td, Adsorbed PF 2/23/2016 Not reviewed this visit You Were Diagnosed With   
  
 Codes Comments Seizure disorder ([facility]    -  Primary ICD-10-CM: M15.656 ICD-9-CM: 345.90 Medication refill     ICD-10-CM: Z76.0 ICD-9-CM: V68.1 Chronic, continuous use of opioids     ICD-10-CM: F11.90 ICD-9-CM: 305.51 Migraine without aura and without status migrainosus, not intractable     ICD-10-CM: T67.534 ICD-9-CM: 346.10 ADD (attention deficit disorder)     ICD-10-CM: F98.8 ICD-9-CM: 314.00 Neuropathy ([facility]     ICD-10-CM: G62.9 ICD-9-CM: 748. 9 Vitals BP Pulse Temp Resp Height(growth percentile) Weight(growth percentile) (!) 140/100 (BP 1 Location: Right arm, BP Patient Position: Sitting) 67 98.1 °F (36.7 °C) (Oral) 16 5' 2\" (1.575 m) 117 lb 9.6 oz (53.3 kg) LMP SpO2 BMI OB Status Smoking Status 11/01/2003 (LMP Unknown) 99% 21.51 kg/m2 Hysterectomy Never Smoker Vitals History BMI and BSA Data  Body Mass Index Body Surface Area  
 21.51 kg/m 2 1.53 m 2  
  
 Preferred Pharmacy Pharmacy Name Phone ANNA VERAS [facility] [address], [address] [facility] [phone] Your Updated Medication List  
  
   
This list is accurate as of: 9/18/17 10:26 AM.  Always use your most recent med list. amLODIPine 10 mg tablet Commonly known as:  NORVASC  
daily. atorvastatin 10 mg tablet Commonly known as:  LIPITOR Take 10 mg by mouth nightly. Cholecalciferol (Vitamin D3) 50,000 unit Cap Take  by mouth every month. dextroamphetamine-amphetamine 30 mg tablet Commonly known as:  ADDERALL Take 1 Tab by mouth two (2) times a dayEarliest Fill Date: 10/18/17. Max Daily Amount: 2 Tabs Start taking on:  10/18/2017  
  
 diazePAM 10 mg tablet Commonly known as:  VALIUM Take 1 Tab by mouth nightly. Max Daily Amount: 10 mg.  
  
 divalproex  mg tablet Commonly known as:  DEPAKOTE Take 1 Tab by mouth two (2) times a day. Lancets Misc  
by Does Not Apply route two (2) times a day. LEXAPRO 20 mg tablet Generic drug:  escitalopram oxalate Take 20 mg by mouth daily. losartan-hydroCHLOROthiazide 100-12.5 mg per tablet Commonly known as:  HYZAAR  
daily. metFORMIN 500 mg tablet Commonly known as:  GLUCOPHAGE Take 500 mg by mouth two (2) times daily (with meals). methylPREDNISolone 4 mg tablet Commonly known as:  MEDROL DOSEPACK  
  
 metoprolol tartrate 100 mg IR tablet Commonly known as:  LOPRESSOR  
  
 omeprazole 20 mg capsule Commonly known as:  PRILOSEC 1 po bid prn  
  
 oxyCODONE-acetaminophen  mg per tablet Commonly known as:  PERCOCET 10 Take 1 Tab by mouth every eight (8) hours as needed for Pain. Max Daily Amount: 3 Tabs. polyethylene glycol 17 gram/dose powder Commonly known as:  MIRALAX  
  
 TOPAMAX 100 mg tablet Generic drug:  topiramate Take 100 mg by mouth daily. TRUE METRIX AIR GLUCOSE METER monitoring kit Generic drug:  Blood-Glucose Meter  
by Does Not Apply route two (2) times a day. TRUE METRIX GLUCOSE TEST STRIP strip Generic drug:  glucose blood VI test strips Prescriptions Printed Refills  
 oxyCODONE-acetaminophen (PERCOCET 10)  mg per tablet 0 Sig: Take 1 Tab by mouth every eight (8) hours as needed for Pain. Max Daily Amount: 3 Tabs. Class: Print Route: Oral  
 diazePAM (VALIUM) 10 mg tablet 3 Sig: Take 1 Tab by mouth nightly. Max Daily Amount: 10 mg.  
 Class: Print Route: Oral  
 dextroamphetamine-amphetamine (ADDERALL) 30 mg tablet 0 Starting on: 10/18/2017 Sig: Take 1 Tab by mouth two (2) times a dayEarliest Fill Date: 10/18/17. Max Daily Amount: 2 Tabs Class: Print Route: Oral  
  
Prescriptions Sent to Pharmacy Refills  
 divalproex DR (DEPAKOTE) 500 mg tablet 2 Sig: Take 1 Tab by mouth two (2) times a day. Class: Normal  
 Pharmacy: [facility] [address] [place] [address] #: [phone] Route: Oral  
  
We Performed the Following [facility] MONITORING [[id number] Custom] Follow-up Instructions Return in about 3 months (around 12/18/2017). Introducing [facility]! Dear Laray Kocher: Thank you for requesting a [employer] account. Our records indicate that you already have an active [employer] account. You can access your account anytime at [email or web address] Did you know that you can access your hospital and ER discharge instructions at any time in [employer]? You can also review all of your test results from your hospital stay or ER visit. Additional Information If you have questions, please visit the Frequently Asked Questions section of the [employer] website at [email or web address] Remember, [employer] is NOT to be used for urgent needs. For medical emergencies, dial 911. Now available from your iPhone and Android! Please provide this summary of care documentation to your next provider. Your primary care clinician is listed as Daniel Holley. If you have any questions after today's visit, please call [phone].